# Patient Record
Sex: FEMALE | Race: WHITE | NOT HISPANIC OR LATINO | Employment: FULL TIME | URBAN - NONMETROPOLITAN AREA
[De-identification: names, ages, dates, MRNs, and addresses within clinical notes are randomized per-mention and may not be internally consistent; named-entity substitution may affect disease eponyms.]

---

## 2017-01-17 ENCOUNTER — TELEPHONE (OUTPATIENT)
Dept: LACTATION | Facility: HOSPITAL | Age: 32
End: 2017-01-17

## 2017-01-17 NOTE — TELEPHONE ENCOUNTER
Infant, girl, 9 months    Pt reports appearance of plugged milk duct in same spot twice in 3 days. Was resolved, then reappeared. Denies any constrictive device on breasts or during sleep. Says has been wearing underwire bra entire lactation period with no adverse effects. Says daughter BF's only at night and first thing in AM now. Relays recently morning feeds have not fully emptied breasts. Daughter teething/biting. Tips for resolving plugged ducts given:  Warm/wet compresses before, cold after a BF or pumping session. Pump to softness only if baby does not BF in AM's, massage area during feed. S/s of mastitis explained to call MD at first sign. Denies these at present. Tips given to discourage biting.

## 2017-01-30 ENCOUNTER — OFFICE VISIT (OUTPATIENT)
Dept: OBSTETRICS AND GYNECOLOGY | Facility: CLINIC | Age: 32
End: 2017-01-30

## 2017-01-30 VITALS
SYSTOLIC BLOOD PRESSURE: 110 MMHG | DIASTOLIC BLOOD PRESSURE: 72 MMHG | HEIGHT: 63 IN | WEIGHT: 146 LBS | BODY MASS INDEX: 25.87 KG/M2

## 2017-01-30 DIAGNOSIS — Z97.5 IUD (INTRAUTERINE DEVICE) IN PLACE: ICD-10-CM

## 2017-01-30 DIAGNOSIS — N93.8 DUB (DYSFUNCTIONAL UTERINE BLEEDING): ICD-10-CM

## 2017-01-30 DIAGNOSIS — Z78.9 NON-SMOKER: ICD-10-CM

## 2017-01-30 DIAGNOSIS — R10.2 PELVIC PAIN IN FEMALE: Primary | ICD-10-CM

## 2017-01-30 PROCEDURE — 99213 OFFICE O/P EST LOW 20 MIN: CPT | Performed by: OBSTETRICS & GYNECOLOGY

## 2017-01-30 NOTE — PROGRESS NOTES
Subjective   Yadira Wallace is a 31 y.o. female is here today for follow-up.    Pelvic Pain   The patient's primary symptoms include pelvic pain and vaginal bleeding. The patient's pertinent negatives include no genital itching, genital lesions, genital odor, genital rash, missed menses or vaginal discharge. This is a chronic problem. The current episode started more than 1 year ago. The problem occurs every several days. The problem has been unchanged. The pain is severe. The problem affects both sides. She is not pregnant. Pertinent negatives include no abdominal pain, anorexia, back pain, chills, constipation, diarrhea, discolored urine, dysuria, fever, flank pain, frequency, headaches, hematuria, joint pain, joint swelling, nausea, painful intercourse, rash, sore throat, urgency or vomiting. The vaginal discharge was normal. The vaginal bleeding is spotting. She has not been passing clots. She has not been passing tissue. The symptoms are aggravated by activity. She has tried nothing for the symptoms. The treatment provided no relief. She is sexually active. No, her partner does not have an STD. She uses an IUD for contraception. Her menstrual history has been irregular. Her past medical history is significant for a  section. There is no history of an abdominal surgery, an ectopic pregnancy, endometriosis, a gynecological surgery, herpes simplex, menorrhagia, metrorrhagia, miscarriage, ovarian cysts, perineal abscess, PID, an STD, a terminated pregnancy or vaginosis.       The following portions of the patient's history were reviewed and updated as appropriate: allergies, current medications, past family history, past medical history, past social history, past surgical history and problem list.    Review of Systems   Constitutional: Negative for chills and fever.   HENT: Negative for sore throat.    Eyes: Negative for photophobia and visual disturbance.   Respiratory: Negative for cough, choking and chest  tightness.    Cardiovascular: Negative for chest pain and leg swelling.   Gastrointestinal: Negative for abdominal pain, anorexia, constipation, diarrhea, nausea and vomiting.   Endocrine: Negative for cold intolerance and heat intolerance.   Genitourinary: Positive for pelvic pain. Negative for dysuria, flank pain, frequency, hematuria, menorrhagia, missed menses, urgency and vaginal discharge.   Musculoskeletal: Negative for back pain and joint pain.   Skin: Negative for rash.   Neurological: Negative for headaches.   Psychiatric/Behavioral: Negative for confusion, decreased concentration and dysphoric mood.       Objective   Physical Exam   Constitutional: She appears well-developed and well-nourished.   Neck: Normal range of motion. Neck supple.   Cardiovascular: Normal rate.    Pulmonary/Chest: Effort normal.   Abdominal: Soft. Normal appearance and bowel sounds are normal. She exhibits no shifting dullness, no distension, no pulsatile liver, no fluid wave, no abdominal bruit, no ascites, no pulsatile midline mass and no mass. There is no tenderness.       Genitourinary: There is no rash, tenderness, lesion or injury on the right labia. There is no rash, tenderness, lesion or injury on the left labia. Uterus is tender (mildly tender to palpation but not c/w pelvic pain she is experiencing). Cervix exhibits no motion tenderness, no discharge and no friability. Right adnexum displays no mass, no tenderness and no fullness. Left adnexum displays no mass, no tenderness and no fullness. There is tenderness (tender over levator muscles and bladder) in the vagina. No erythema or bleeding in the vagina. No foreign body in the vagina. No signs of injury around the vagina. No vaginal discharge found.       Skin: Skin is warm and dry.   Psychiatric: She has a normal mood and affect. Her behavior is normal. Judgment and thought content normal.         Assessment/Plan   Yadira was seen today for pelvic pain.    Diagnoses and  all orders for this visit:    Pelvic pain in female  Comments:  Puff and IC research.  If c/w IC, consider bladder Rx.  If not, consider pelvic floor muscle dysfunction as diagnosis and refer to PT.    Non-smoker    IUD (intrauterine device) in place  Comments:  Strings not visualized.  Check placement with U/S    DUB (dysfunctional uterine bleeding)  Comments:  Could be IUD related.  However, pain is not since it started during pregnancy prior to IUD insertion.      Jeovany Lisa MD

## 2017-01-30 NOTE — MR AVS SNAPSHOT
Yadira Wallace   1/30/2017 8:45 AM   Office Visit    Dept Phone:  [phone]   Encounter #:  [id number]    Provider:  Jeovany Lisa MD   Department:  [facility]                Your Full Care Plan              Today's Medication Changes          These changes are accurate as of: 1/30/17  9:16 AM.  If you have any questions, ask your nurse or doctor.               Stop taking medication(s)listed here:     nystatin 670870 UNIT/GM powder   Commonly known as:  MYCOSTATIN   Stopped by:  Jeovany Lisa MD           PNV-DHA+DOCUSATE 27-1. MG capsule   Stopped by:  Jeovany Lisa MD                      Your Updated Medication List          This list is accurate as of: 1/30/17  9:16 AM.  Always use your most recent med list.                MIRENA (52 MG) 20 MCG/24HR IUD   Generic drug:  levonorgestrel       SUMAtriptan 100 MG tablet   Commonly known as:  IMITREX               You Were Diagnosed With        Codes Comments    Pelvic pain in female    -  Primary ICD-10-CM: R10.2  ICD-9-CM: 625.9 Puff and IC research.  If c/w IC, consider bladder Rx.  If not, consider pelvic floor muscle dysfunction as diagnosis and refer to PT.    Non-smoker     ICD-10-CM: Z78.9  ICD-9-CM: V49.89     IUD (intrauterine device) in place     ICD-10-CM: Z97.5  ICD-9-CM: V45.51 Strings not visualized.  Check placement with U/S    DUB (dysfunctional uterine bleeding)     ICD-10-CM: N93.8  ICD-9-CM: 626.8 Could be IUD related.  However, pain is not since it started during pregnancy prior to IUD insertion.      Instructions     None    Patient Instructions History      Upcoming Appointments     Visit Type Date Time Department    OFFICE VISIT 1/30/2017  8:45 AM [facility]    ULTRASOUND 2/13/2017 10:00 AM [facility]    OFFICE VISIT 2/13/2017 10:30 AM [facility]      MyChart Signup     Our records indicate that you have an active [facility] account.    You can  "view your After Visit Summary by going to [employer] and logging in with your [facility] username and password.  If you don't have a [facility] username and password but a parent or guardian has access to your record, the parent or guardian should login with their own [facility] username and password and access your record to view the After Visit Summary.    If you have questions, you can email [email or web address] or call [phone] to talk to our [facility] staff.  Remember, [facility] is NOT to be used for urgent needs.  For medical emergencies, dial 911.               Other Info from Your Visit           Your Appointments     Feb 13, 2017 10:00 AM CST   Ultrasound with ULTRASOUND 2 OBGYN [facility] (--)    [address]  [place] [address]   [phone]            Feb 13, 2017 10:30 AM CST   Office Visit with Jeovany Lisa MD   [facility] (--)    [address]  [place] [facility] [address]   [phone]           Arrive 15 minutes prior to appointment.              Allergies     No Known Allergies      Reason for Visit     Pelvic Pain Pt states at several times per week she experiences lower abdonimal pain and pressure. And she feels like her hormones are out of wack.      Vital Signs     Blood Pressure Height Weight Last Menstrual Period Breastfeeding? Body Mass Index    110/72 63\" (160 cm) 146 lb (66.2 kg) 01/12/2017 No 25.86 kg/m2    Smoking Status                   Never Smoker           Problems and Diagnoses Noted     Pelvic pain in female    -  Primary    Non-smoker        Uses birth control        DUB (dysfunctional uterine bleeding)            "

## 2017-02-02 ENCOUNTER — OFFICE VISIT (OUTPATIENT)
Dept: OTOLARYNGOLOGY | Facility: CLINIC | Age: 32
End: 2017-02-02

## 2017-02-02 ENCOUNTER — PROCEDURE VISIT (OUTPATIENT)
Dept: OTOLARYNGOLOGY | Facility: CLINIC | Age: 32
End: 2017-02-02

## 2017-02-02 VITALS
DIASTOLIC BLOOD PRESSURE: 75 MMHG | WEIGHT: 148 LBS | SYSTOLIC BLOOD PRESSURE: 127 MMHG | HEART RATE: 81 BPM | BODY MASS INDEX: 26.22 KG/M2 | TEMPERATURE: 98.6 F | HEIGHT: 63 IN

## 2017-02-02 DIAGNOSIS — H69.82 ETD (EUSTACHIAN TUBE DYSFUNCTION), LEFT: Primary | ICD-10-CM

## 2017-02-02 PROBLEM — H69.80 ETD (EUSTACHIAN TUBE DYSFUNCTION): Status: ACTIVE | Noted: 2017-02-02

## 2017-02-02 PROCEDURE — 99203 OFFICE O/P NEW LOW 30 MIN: CPT | Performed by: NURSE PRACTITIONER

## 2017-02-02 RX ORDER — AZELASTINE 1 MG/ML
2 SPRAY, METERED NASAL 2 TIMES DAILY
Qty: 30 ML | Refills: 6 | Status: SHIPPED | OUTPATIENT
Start: 2017-02-02 | End: 2017-03-04

## 2017-02-02 RX ORDER — FLUTICASONE PROPIONATE 50 MCG
2 SPRAY, SUSPENSION (ML) NASAL DAILY
Qty: 1 BOTTLE | Refills: 6 | Status: SHIPPED | OUTPATIENT
Start: 2017-02-02 | End: 2017-03-04

## 2017-02-02 NOTE — PROGRESS NOTES
PRIMARY CARE PROVIDER: No Known Provider  REFERRING PROVIDER: No ref. provider found    Chief Complaint   Patient presents with   • Ear Problem     Ears 'Feel clogged up'       Subjective   History of Present Illness:  Yadira Wallace is a  31 y.o.  female who complains of decreased hearing. The symptoms are localized to the left ear. The patient has had moderate symptoms. The symptoms have been relatively constant for the last several months . The symptoms are aggravated by  no identifiable factors . The symptoms are improved by no identifieable factors. The patient states she has had significant loss in hearing in her left hear onset 2 months ago following a sinus infection. She denies pain or drainage. She denies popping, cracking, fullness, or tinnitus. She denies dizziness. She has a history of PE tubes as a child.    Review of Systems:  Review of Systems   Constitutional: Negative.  Negative for appetite change, fatigue and fever.   HENT: Positive for hearing loss. Negative for ear discharge, ear pain, sinus pressure, tinnitus and trouble swallowing.    Respiratory: Negative for cough and shortness of breath.    Cardiovascular: Negative for chest pain.   Gastrointestinal: Negative for nausea and vomiting.   Neurological: Negative for headaches.   All other systems reviewed and are negative.      Past History:  Past Medical History   Diagnosis Date   • 30 weeks gestation of pregnancy    • 32 weeks gestation of pregnancy    • 34 weeks gestation of pregnancy    • Abnormal fetal position    • BMI 28.0-28.9,adult    • Choroid plexus cyst of fetus    • Encounter for IUD removal    • Encounter for routine examination for contraception    • Encounter for routine gynecological examination    • Family planning advice    • IUD (intrauterine device) in place    • IUD check up    • Migraine    • Nonsmoker    • Postpartum state    • Pre-conception counseling    • Rubella non-immune status, antepartum    • Sinus infection    •  Supervision of normal first pregnancy      subsequent visits     Past Surgical History   Procedure Laterality Date   •  section     • Myringotomy w/ tubes Bilateral      x2 (as a child)     Family History   Problem Relation Age of Onset   • Hypertension Father    • Diabetes Father      type 1   • Other Father      CABG   • Coronary artery disease Father    • Diabetes Sister      Type 1, In stable health   • No Known Problems Mother    • No Known Problems Brother    • Breast cancer Neg Hx    • Ovarian cancer Neg Hx    • Uterine cancer Neg Hx    • Colon cancer Neg Hx    • Melanoma Neg Hx    • Prostate cancer Neg Hx      Social History   Substance Use Topics   • Smoking status: Never Smoker   • Smokeless tobacco: None   • Alcohol use Yes      Comment: occasional       Current Outpatient Prescriptions:   •  levonorgestrel (MIRENA, 52 MG,) 20 MCG/24HR IUD, 1 each by Intrauterine route 1 (one) time., Disp: , Rfl:   •  SUMAtriptan (IMITREX) 100 MG tablet, TAKE 1 TABLET BY MOUTH AS DIRECTED, Disp: , Rfl: 6  •  azelastine (ASTELIN) 0.1 % nasal spray, 2 sprays into each nostril 2 (Two) Times a Day for 30 days. Use in each nostril as directed, Disp: 30 mL, Rfl: 6  •  fluticasone (FLONASE) 50 MCG/ACT nasal spray, 2 sprays into each nostril Daily for 30 days. Administer 2 sprays in each nostril for each dose., Disp: 1 bottle, Rfl: 6  Allergies:  Review of patient's allergies indicates no known allergies.    Objective     Vital Signs:  Temp:  [98.6 °F (37 °C)] 98.6 °F (37 °C)  Heart Rate:  [81] 81  BP: (127)/(75) 127/75    Physical Exam:  CONSTITUTIONAL: well nourished, well-developed, alert, oriented, in no acute distress   COMMUNICATION AND VOICE: able to communicate normally, normal voice quality  HEAD: normocephalic, no lesions, atraumatic, no tenderness, no masses   FACE: appearance normal, no lesions, no tenderness, no deformities, facial motion symmetric  SALIVARY GLANDS: parotid glands with no tenderness, no  swelling, no masses, submandibular glands with normal size, nontender  EYES: ocular motility normal, eyelids normal, orbits normal, no proptosis, conjunctiva normal , pupils equal, round   EARS:  Hearing: response to conversational voice normal bilaterally   External Ears: auricles without lesions  Otoscopic: tympanic membrane appearance normal with mild tympanosclerosis bilaterally, no lesions, no perforation, normal mobility, no fluid; there is mild retraction on the left  NOSE:  External Nose: structure normal, no tenderness on palpation, no nasal discharge, no lesions, no evidence of trauma, nostrils patent   Intranasal Exam: nasal mucosa normal, vestibule within normal limits, inferior turbinate normal, nasal septum midline   Nasopharynx: nasopharyngeal mucosa, adenoids within normal limits  ORAL:  Lips: upper and lower lips without lesion   Teeth: dentition within normal limits for age   Gums: gingivae healthy   Oral Mucosa: oral mucosa normal, no mucosal lesions   Floor of Mouth: Warthin’s duct patent, mucosa normal  Tongue: lingual mucosa normal without lesions, normal tongue mobility   Palate: soft and hard palates with normal mucosa and structure  Oropharynx: oropharyngeal mucosa normal, tonsils normal in appearance   HYPOPHARYNX: hypopharyngeal mucosa normal  LARYNX: epiglottis and arytenoid cartilage within normal limits, vocal cord mucosa normal with normal mobility   NECK: neck appearance normal, no masses or tenderness  THYROID: no overt thyromegaly, no tenderness, nodules or mass present on palpation, position midline   LYMPH NODES: no lymphadenopathy  CHEST/RESPIRATORY: respiratory effort normal, normal breath sounds   CARDIOVASCULAR: rate and rhythm normal, extremities without cyanosis or edema    NEUROLOGIC/PSYCHIATRIC: oriented to time, place and person, mood normal, affect appropriate, CN II-XII intact grossly    Results Review:                           Assessment   1. ETD (eustachian tube  dysfunction), left        Plan   Continue current management plan.      -------MEDICATIONS:-------  New Medications Ordered This Visit   Medications   • fluticasone (FLONASE) 50 MCG/ACT nasal spray     Si sprays into each nostril Daily for 30 days. Administer 2 sprays in each nostril for each dose.     Dispense:  1 bottle     Refill:  6   • azelastine (ASTELIN) 0.1 % nasal spray     Si sprays into each nostril 2 (Two) Times a Day for 30 days. Use in each nostril as directed     Dispense:  30 mL     Refill:  6           -----INSTRUCTIONS-----  For the best response, use your nasal sprays every day without skipping doses. It may take several weeks before the full effect is acheived.     -----FOLLOW UP-----  Return in about 6 weeks (around 3/16/2017) for Recheck ears.        Destini Boss, SHAR  17  12:53 PM

## 2017-02-13 ENCOUNTER — OFFICE VISIT (OUTPATIENT)
Dept: OBSTETRICS AND GYNECOLOGY | Facility: CLINIC | Age: 32
End: 2017-02-13

## 2017-02-13 ENCOUNTER — PROCEDURE VISIT (OUTPATIENT)
Dept: OBSTETRICS AND GYNECOLOGY | Facility: CLINIC | Age: 32
End: 2017-02-13

## 2017-02-13 VITALS
HEIGHT: 63 IN | DIASTOLIC BLOOD PRESSURE: 80 MMHG | BODY MASS INDEX: 25.87 KG/M2 | SYSTOLIC BLOOD PRESSURE: 100 MMHG | WEIGHT: 146 LBS

## 2017-02-13 DIAGNOSIS — Z78.9 NON-SMOKER: Primary | ICD-10-CM

## 2017-02-13 DIAGNOSIS — N93.8 DUB (DYSFUNCTIONAL UTERINE BLEEDING): ICD-10-CM

## 2017-02-13 DIAGNOSIS — R10.2 PELVIC PAIN: Primary | ICD-10-CM

## 2017-02-13 DIAGNOSIS — M62.89 PELVIC FLOOR DYSFUNCTION: ICD-10-CM

## 2017-02-13 PROCEDURE — 99213 OFFICE O/P EST LOW 20 MIN: CPT | Performed by: OBSTETRICS & GYNECOLOGY

## 2017-02-13 PROCEDURE — 76830 TRANSVAGINAL US NON-OB: CPT | Performed by: OBSTETRICS & GYNECOLOGY

## 2017-02-13 NOTE — PROGRESS NOTES
Subjective   aYdira Wallace is a 31 y.o. female is here today for follow-up.  IC research and PUFF not c/w interstitial cystitis.  Prior PE consistent with pelvic floor dysfunction.  U/S shows normal IUD position and no gyn etiology for pain.    Pelvic Pain   The patient's primary symptoms include pelvic pain and vaginal bleeding. The patient's pertinent negatives include no genital itching, genital lesions, genital odor, genital rash, missed menses or vaginal discharge. The current episode started more than 1 year ago. The problem occurs every several days. The problem has been unchanged. The pain is moderate. The problem affects both sides. She is not pregnant. Pertinent negatives include no abdominal pain, anorexia, back pain, chills, constipation, diarrhea, discolored urine, dysuria, fever, flank pain, frequency, headaches, hematuria, joint pain, joint swelling, nausea, painful intercourse, rash, sore throat, urgency or vomiting. The vaginal discharge was normal. The vaginal bleeding is spotting. She has not been passing clots. She has not been passing tissue. The symptoms are aggravated by activity. She has tried nothing for the symptoms. The treatment provided no relief. She is sexually active. No, her partner does not have an STD. She uses an IUD for contraception. Her menstrual history has been irregular. Her past medical history is significant for a  section. There is no history of an abdominal surgery, an ectopic pregnancy, endometriosis, a gynecological surgery, herpes simplex, menorrhagia, metrorrhagia, miscarriage, ovarian cysts, perineal abscess, PID, an STD, a terminated pregnancy or vaginosis.       The following portions of the patient's history were reviewed and updated as appropriate: allergies, current medications, past family history, past medical history, past social history, past surgical history and problem list.    Review of Systems   Constitutional: Negative for chills and fever.    HENT: Negative for sore throat.    Eyes: Negative for photophobia and visual disturbance.   Respiratory: Negative for cough, choking and chest tightness.    Cardiovascular: Negative for chest pain and leg swelling.   Gastrointestinal: Negative for abdominal pain, anorexia, constipation, diarrhea, nausea and vomiting.   Endocrine: Negative for cold intolerance and heat intolerance.   Genitourinary: Positive for pelvic pain. Negative for dysuria, flank pain, frequency, hematuria, menorrhagia, missed menses, urgency and vaginal discharge.   Musculoskeletal: Negative for back pain and joint pain.   Skin: Negative for rash.   Neurological: Negative for light-headedness, numbness and headaches.   Psychiatric/Behavioral: Negative for decreased concentration, dysphoric mood and hallucinations.       Objective   Physical Exam   Constitutional: She appears well-developed and well-nourished.   Neck: Normal range of motion. Neck supple.   Cardiovascular: Normal rate and regular rhythm.    Pulmonary/Chest: Effort normal and breath sounds normal.   Abdominal: Soft. Bowel sounds are normal.   Skin: Skin is warm and dry.   Psychiatric: She has a normal mood and affect. Her behavior is normal. Judgment and thought content normal.   Nursing note and vitals reviewed.        Assessment/Plan   Yadira was seen today for pelvic pain.    Diagnoses and all orders for this visit:    Non-smoker    DUB (dysfunctional uterine bleeding)  Comments:  Spotting off and on.    Pelvic floor dysfunction  -     Ambulatory Referral to Physical Therapy Women's Health        Jeovany Lisa MD

## 2017-02-22 ENCOUNTER — HOSPITAL ENCOUNTER (OUTPATIENT)
Dept: PHYSICAL THERAPY | Facility: HOSPITAL | Age: 32
Setting detail: THERAPIES SERIES
Discharge: HOME | End: 2017-02-22
Attending: OBSTETRICS & GYNECOLOGY

## 2017-02-22 DIAGNOSIS — M62.89 PELVIC FLOOR DYSFUNCTION: ICD-10-CM

## 2017-02-22 DIAGNOSIS — R10.2 PELVIC PAIN: Primary | ICD-10-CM

## 2017-02-22 PROCEDURE — 97161 PT EVAL LOW COMPLEX 20 MIN: CPT | Performed by: PHYSICAL THERAPIST

## 2017-02-22 PROCEDURE — 97110 THERAPEUTIC EXERCISES: CPT | Performed by: PHYSICAL THERAPIST

## 2017-02-22 NOTE — PROGRESS NOTES
Outpatient Physical Therapy Women's Health Initial Evaluation  [facility]     Patient Name: Yadira Wallace  : 1985  MRN: [id number]  Today's Date: 2017        Visit Date: 2017    Patient Active Problem List   Diagnosis   • History of migraine headaches   • ETD (eustachian tube dysfunction)        Past Medical History   Diagnosis Date   • 30 weeks gestation of pregnancy    • 32 weeks gestation of pregnancy    • 34 weeks gestation of pregnancy    • Abnormal fetal position    • BMI 28.0-28.9,adult    • Choroid plexus cyst of fetus    • Encounter for IUD removal    • Encounter for routine examination for contraception    • Encounter for routine gynecological examination    • Family planning advice    • IUD (intrauterine device) in place    • IUD check up    • Migraine    • Nonsmoker    • Postpartum state    • Pre-conception counseling    • Rubella non-immune status, antepartum    • Sinus infection    • Supervision of normal first pregnancy      subsequent visits        Past Surgical History   Procedure Laterality Date   •  section     • Myringotomy w/ tubes Bilateral      x2 (as a child)         Visit Dx:    ICD-10-CM ICD-9-CM   1. Pelvic pain R10.2 [id number]   2. Pelvic floor dysfunction N81.84 618.83             Patient History       17 1100          History    Chief Complaint Pain  -KR      Type of Pain Other pain   pelvic pain  -KR      Date Current Problem(s) Began --   2015  -KR      Brief Description of Current Complaint  2016. She reports bad pelvic pain throughout pregnacy. She weight 3 11 when born. She reports pain never went away. She repots starting to try to work last fall and increased pain with activity. She reports increased pain with standing, walking, sitting on harder surfaces. Lower abdominal pain, pressure.    -KR      Patient/Caregiver Goals Relieve pain;Return to prior level of function  -KR      Patient's Rating of General Health Good  -KR       Hand Dominance right-handed  -KR      What clinical tests have you had for this problem? Other 1 (comment)   ultrasound  -KR      Are you or can you be pregnant No  -KR      Pain     Pain Location Pelvis  -KR      Pain at Present 0  -KR      Pain at Best 0  -KR      Pain at Worst 9  -KR      Pain Frequency Intermittent  -KR      Pain Description Pressure;Shooting;Sharp  -KR      What Performance Factors Make the Current Problem(s) WORSE? standing, walking, gym activities, sitting on hard floor surface  -KR      What Performance Factors Make the Current Problem(s) BETTER? rest  -KR      Is your sleep disturbed? No  -KR      Fall Risk Assessment    Any falls in the past year: No  -KR      Daily Activities    Pt Participated in POC and Goals Yes  -KR      Safety    Are you being hurt, hit, or frightened by anyone at home or in your life? No  -KR      Are you being neglected by a caregiver No  -KR        User Key  (r) = Recorded By, (t) = Taken By, (c) = Cosigned By    Initials Name Provider Type    CHUCK Villafuerte, PT DPT Physical Therapist                Women's Health       17 1100          Pregnancy Questions    Number of Pregnancies 2  -KR      Number of Children 2  -KR      Type of Previous Deliveries Vaginal;  -KR      Do you have radicular pain or numbness? No  -KR      Are you currently exercising? Yes   increases pain  -KR        User Key  (r) = Recorded By, (t) = Taken By, (c) = Cosigned By    Initials Name Provider Type    CHUCK Villafuerte PT DPT Physical Therapist              PT Ortho       17 1100    Posture/Observations    Alignment Options Lumbar lordosis;Iliac crests  -KR    Lumbar lordosis Mild;Increased  -KR    Sensation    Sensation WNL? WNL  -KR    Quarter Clearing    Quarter Clearing Lower Quarter Clearing  -KR    Lumbar ROM Screen- Lower Quarter Clearing    Lumbar Flexion Normal  -KR    Lumbar Extension Normal  -KR    Special Tests/Palpation    Special  Tests/Palpation Lumbar/SI;Hip  -KR    Lumbosacral Palpation    Pubic Symphysis Bilateral:;Tender  -KR    Lumbosacral Palpation? Yes   diastasis- 2 knuckle depth at umbilicus and 2 finger width  -KR    Lumbar/SI Special Tests    Sacral Spring Test (SI Dysfunction) Bilateral:;Negative  -KR    Special Lumbosacral Questions    Are you pregnant? No  -KR    Have you had any abdominal surgeries? Yes  -KR    Hip/Thigh Palpation    Adductors Bilateral:;Guarded/taut  -KR    Hip/Thigh Palpation? Yes  -KR    Hip Special Tests    Trendelenberg sign (gluteus medius weakness) Bilateral:;Positive  -KR    Leg Length Test    Apparent Unequal   L innominate posteriorly rotated slight upslip; MET improved  -KR    MMT (Manual Muscle Testing)    General MMT Assessment Detail hip abduction 3+/5 adduction 4-/5  -KR    Pathomechanics    Lower Extremity Pathomechanics Trendelenburg with midstance  -KR    Balance Skills Training    SLS <5 seconds without compensation  -KR    Stairs Assessment/Treatment    Stairs, Comment hip dropo and medial knee dive with descending  -KR      User Key  (r) = Recorded By, (t) = Taken By, (c) = Cosigned By    Initials Name Provider Type    CHUCK Villafuerte, PT DPT Physical Therapist                           PT Assessment/Plan       02/22/17 1100          PT Assessment    Functional Limitations Impaired gait;Limitation in home management;Limitations in community activities;Performance in leisure activities;Performance in sport activities  -KR      Impairments Balance;Gait;Muscle strength;Pain;Posture;Joint mobility;Impaired postural alignment  -KR      Assessment Comments  presents with compliant of pelvic/lower abdominal pain that increases with activity. Upon assessment I found her pelvis to be malaligned and decreased stabiltiy throughout her hips and core. I have issued her a stability belt and start no gentle core/hip strengthening. Next visit we will assess her pelvic floor and give home  program/progress as appropriate. She is very motived and should progress well.   -KR      Please refer to paper survey for additional self-reported information Yes  -KR      Rehab Potential Excellent  -KR      Patient/caregiver participated in establishment of treatment plan and goals Yes  -KR      Patient would benefit from skilled therapy intervention Yes  -KR      PT Plan    PT Frequency --   1-4 times per month  -KR      Predicted Duration of Therapy Intervention (days/wks) 2-4 months  -KR      Planned CPT's? PT EVAL: 82475;PT THER PROC EA 15 MIN: 31509;PT THER ACT EA 15 MIN: 10684;PT MANUAL THERAPY EA 15 MIN: 69680;PT NEUROMUSC RE-EDUCATION EA 15 MIN: 06555;PT GAIT TRAINING EA 15 MIN: 15798;PT ELECTRICAL STIM UNATTEND: ;PT ELECTRICAL STIM ATTD EA 15 MIN: 13071  -KR      PT Plan Comments We will start by ensuring proper alignment of her pelvis and progressing with stabiltily activities as tolelrated. We will progress her to returning to gym activities.   -KR        User Key  (r) = Recorded By, (t) = Taken By, (c) = Cosigned By    Initials Name Provider Type    CHUCK Villafuerte, PT DPT Physical Therapist                  Exercises       02/22/17 1100          Exercise 1    Exercise Name 1 TA  -KR      Reps 1 5  -KR      Exercise 2    Exercise Name 2 PPT  -KR      Reps 2 5  -KR      Exercise 3    Exercise Name 3 hooklying clam with green TB   -KR      Reps 3 5  -KR      Exercise 4    Exercise Name 4 hip adduction  -KR      Reps 4 5  -KR        User Key  (r) = Recorded By, (t) = Taken By, (c) = Cosigned By    Initials Name Provider Type    CHUCK Villafuerte, PT DPT Physical Therapist                            PT OP Goals       02/22/17 1100          PT Short Term Goals    STG Date to Achieve 04/19/17  -KR      STG 1 Pt will report pain no greater than 3-4/10 with activity  -KR      STG 1 Progress New  -KR      STG 2 Pt will be independent with initial HEP.   -KR      STG 2 Progress New  -KR      STG  3 Pt will have aligned pelvis for 2 visits.   -KR      STG 3 Progress New  -KR      Long Term Goals    LTG Date to Achieve 06/14/17  -KR      LTG 1 Pt will be able to return to gym activity without increase in pain.   -KR      LTG 1 Progress New  -KR      LTG 2 Pt will have 4/5 or greater hip abduction.   -KR      LTG 2 Progress New  -KR      LTG 3 Pt will perform SLS for 30 seconds or greater without compensation.   -KR      LTG 3 Progress New  -KR      LTG 4 Pt perform step down with minimal medial knee dive or hip drop.   -KR      LTG 4 Progress New  -KR      Time Calculation    PT Goal Re-Cert Due Date 03/24/17  -KR        User Key  (r) = Recorded By, (t) = Taken By, (c) = Cosigned By    Initials Name Provider Type    CHUCK Villafuerte PT DPT Physical Therapist                Therapy Education       02/22/17 1100    Therapy Education    Given HEP;Posture/body mechanics;Symptoms/condition management   issued M SI belt, educated on wear  -KR    Program New   Ta, PPT, hip abd with green TB and hip Add  -KR    How Provided Verbal;Demonstration;Written  -KR    Provided to Patient  -KR    Level of Understanding Verbalized;Demonstrated  -KR      User Key  (r) = Recorded By, (t) = Taken By, (c) = Cosigned By    Initials Name Provider Type    CHUCK Villafuerte PT DPT Physical Therapist                 Time Calculation:   Start Time: 1100  Stop Time: 1154  Time Calculation (min): 54 min  Total Timed Code Minutes- PT: 12 minute(s)    Therapy Charges for Today     Code Description Service Date Service Provider Modifiers Qty    48229026677 HC PT EVAL LOW COMPLEXITY 3 2/22/2017 Nena Villafuerte, PT DPT GP 1    29292137495 HC PT THER PROC EA 15 MIN 2/22/2017 Nena Villafuerte, PT DPT GP 1                  Nena Villafuerte PT DPT  2/22/2017

## 2017-03-08 ENCOUNTER — HOSPITAL ENCOUNTER (OUTPATIENT)
Dept: PHYSICAL THERAPY | Facility: HOSPITAL | Age: 32
Setting detail: THERAPIES SERIES
Discharge: HOME | End: 2017-03-08

## 2017-03-08 DIAGNOSIS — M62.89 PELVIC FLOOR DYSFUNCTION: ICD-10-CM

## 2017-03-08 DIAGNOSIS — R10.2 PELVIC PAIN: Primary | ICD-10-CM

## 2017-03-08 PROCEDURE — 97110 THERAPEUTIC EXERCISES: CPT | Performed by: PHYSICAL THERAPIST

## 2017-03-08 NOTE — PROGRESS NOTES
Outpatient Physical Therapy Women's Health Treatment Note  [facility]     Patient Name: Yadira Wallace  : 1985  MRN: [id number]  Today's Date: 3/8/2017        Visit Date: 2017    Visit Dx:    ICD-10-CM ICD-9-CM   1. Pelvic pain R10.2 [id number]   2. Pelvic floor dysfunction N81.84 618.83       Patient Active Problem List   Diagnosis   • History of migraine headaches   • ETD (eustachian tube dysfunction)                               PT Assessment/Plan       17 1438       PT Assessment    Assessment Comments No goals met today, this was her first visit since initial evaluation. She is reporting less pain, but only completed strenuous physical activity once since her first visit. Her pelvis was in alignment today. I updated her HEP to progress with hip and core stability. I also applied KT tape to facilitate her rectus abdominis.   -KR     PT Plan    PT Plan Comments Re-assess HEP and taping. Assess pelvic floor, as needed. Progress with standing, higher intensity activity next visit and assess tolerance.   -KR       User Key  (r) = Recorded By, (t) = Taken By, (c) = Cosigned By    Initials Name Provider Type    CHUCK Villafuerte, PT DPT Physical Therapist                      Exercises       17 1438          Subjective Comments    Subjective Comments She repots feeling like she is doing better. She reports not doing anything strenuous in the last couple weeks.   -KR      Subjective Pain    Able to rate subjective pain? yes  -KR      Exercise 1    Exercise Name 1 SB bridges   -KR      Sets 1 2  -KR      Reps 1 15  -KR      Exercise 2    Exercise Name 2 sidelying clams  -KR      Sets 2 2  -KR      Reps 2 20  -KR      Exercise 3    Exercise Name 3 BKFO  -KR      Reps 3 20  -KR      Exercise 4    Exercise Name 4 scar tissue massage on lower abdomminal  -KR      Time (Minutes) 4 5  -KR      Exercise 5    Exercise Name 5 Kt applied to faciliate rectus abdominus  -KR        User Key  (r) =  Recorded By, (t) = Taken By, (c) = Cosigned By    Initials Name Provider Type    CHUCK Villafuerte, PT DPT Physical Therapist                            PT OP Goals       03/08/17 1438       PT Short Term Goals    STG Date to Achieve 04/19/17  -KR     STG 1 Pt will report pain no greater than 3-4/10 with activity  -KR     STG 1 Progress Ongoing  -KR     STG 1 Progress Comments 5-6/10 post running without SI belt only last couple hours  -KR     STG 2 Pt will be independent with initial HEP.   -KR     STG 2 Progress Ongoing  -KR     STG 2 Progress Comments compliant  -KR     STG 3 Pt will have aligned pelvis for 2 visits.   -KR     STG 3 Progress Ongoing  -KR     STG 3 Progress Comments aligned today  -KR     Long Term Goals    LTG Date to Achieve 06/14/17  -KR     LTG 1 Pt will be able to return to gym activity without increase in pain.   -KR     LTG 1 Progress Ongoing  -KR     LTG 2 Pt will have 4/5 or greater hip abduction.   -KR     LTG 2 Progress Ongoing  -KR     LTG 3 Pt will perform SLS for 30 seconds or greater without compensation.   -KR     LTG 3 Progress Ongoing  -KR     LTG 4 Pt perform step down with minimal medial knee dive or hip drop.   -KR     LTG 4 Progress Ongoing  -KR     Time Calculation    PT Goal Re-Cert Due Date 03/24/17  -KR       User Key  (r) = Recorded By, (t) = Taken By, (c) = Cosigned By    Initials Name Provider Type    CHUCK Villafuerte, PT DPT Physical Therapist                 Therapy Education       03/08/17 1438          Therapy Education    Given HEP;Posture/body mechanics  -KR      Program Progressed   BKFO, sidelying clams, bridges  -KR      How Provided Verbal;Demonstration;Written  -KR      Provided to Patient  -KR      Level of Understanding Verbalized;Demonstrated  -KR        User Key  (r) = Recorded By, (t) = Taken By, (c) = Cosigned By    Initials Name Provider Type    CHUCK Villafuerte, PT DPT Physical Therapist                Time Calculation:   Start Time:  1438  Stop Time: 1520  Time Calculation (min): 42 min  Total Timed Code Minutes- PT: 40 minute(s)    Therapy Charges for Today     Code Description Service Date Service Provider Modifiers Qty    31133533564  PT THER PROC EA 15 MIN 3/8/2017 Nena Villafuerte, PT DPT GP 3                    Nena Villafuerte, PT DPT  3/8/2017

## 2017-03-15 ENCOUNTER — HOSPITAL ENCOUNTER (OUTPATIENT)
Dept: PHYSICAL THERAPY | Facility: HOSPITAL | Age: 32
Setting detail: THERAPIES SERIES
Discharge: HOME | End: 2017-03-15

## 2017-03-15 DIAGNOSIS — R10.2 PELVIC PAIN: Primary | ICD-10-CM

## 2017-03-15 DIAGNOSIS — M62.89 PELVIC FLOOR DYSFUNCTION: ICD-10-CM

## 2017-03-15 PROCEDURE — 97110 THERAPEUTIC EXERCISES: CPT | Performed by: PHYSICAL THERAPIST

## 2017-03-15 NOTE — PROGRESS NOTES
Outpatient Physical Therapy Women's Health Treatment Note  [facility]     Patient Name: Yadira Wallace  : 1985  MRN: [id number]  Today's Date: 3/15/2017        Visit Date: 03/15/2017    Visit Dx:    ICD-10-CM ICD-9-CM   1. Pelvic pain R10.2 [id number]   2. Pelvic floor dysfunction N81.84 618.83       Patient Active Problem List   Diagnosis   • History of migraine headaches   • ETD (eustachian tube dysfunction)                               PT Assessment/Plan       03/15/17 1503       PT Assessment    Assessment Comments Pelvic floor assessment reproduced patient's pain today. She did have muscle guarding with referred pain throughout. I have updated her home program to include pelvic floor stretch. I did not progress her other exercises.   -KR     PT Plan    PT Plan Comments Assess HEP and progression, possibly teach self pelvic floor massage and progress towards functional/standing activities.   -KR       User Key  (r) = Recorded By, (t) = Taken By, (c) = Cosigned By    Initials Name Provider Type    CHUCK Villafuerte, PT DPT Physical Therapist                      Exercises       03/15/17 1503          Subjective Comments    Subjective Comments She reports a little more sore this week. She reports going for walks and it didn't hurt at all. She reports other than that not much physical activity, just overall soreness  -KR      Subjective Pain    Able to rate subjective pain? yes  -KR      Exercise 1    Exercise Name 1 verbal consent for internal pelvic floor assessment; see assessment for results  -KR      Exercise 2    Exercise Name 2 happy baby stretch  -KR      Time (Seconds) 2 30  -KR      Exercise 3    Exercise Name 3 discussed HEP and progression  -KR        User Key  (r) = Recorded By, (t) = Taken By, (c) = Cosigned By    Initials Name Provider Type    CHUCK Villafuerte, PT DPT Physical Therapist                            PT OP Goals       03/15/17 1503       PT Short Term Goals    STG Date  to Achieve 04/19/17  -KR     STG 1 Pt will report pain no greater than 3-4/10 with activity  -KR     STG 1 Progress Ongoing  -KR     STG 1 Progress Comments 5/10   -KR     STG 2 Pt will be independent with initial HEP.   -KR     STG 2 Progress Ongoing  -KR     STG 2 Progress Comments added happy baby stretch  -KR     STG 3 Pt will have aligned pelvis for 2 visits.   -KR     STG 3 Progress Met  -KR     Long Term Goals    LTG Date to Achieve 06/14/17  -KR     LTG 1 Pt will be able to return to gym activity without increase in pain.   -KR     LTG 1 Progress Ongoing  -KR     LTG 1 Progress Comments has not returned yet  -KR     LTG 2 Pt will have 4/5 or greater hip abduction.   -KR     LTG 2 Progress Ongoing  -KR     LTG 3 Pt will perform SLS for 30 seconds or greater without compensation.   -KR     LTG 3 Progress Ongoing  -KR     LTG 4 Pt perform step down with minimal medial knee dive or hip drop.   -KR     LTG 4 Progress Ongoing  -KR     Time Calculation    PT Goal Re-Cert Due Date 03/24/17  -KR       User Key  (r) = Recorded By, (t) = Taken By, (c) = Cosigned By    Initials Name Provider Type    CHUCK Villafuerte PT DPT Physical Therapist                 Therapy Education       03/15/17 1503          Therapy Education    Given HEP;Symptoms/condition management;Posture/body mechanics  -KR      Program Progressed  -KR      How Provided Verbal;Demonstration;Written  -KR      Provided to Patient  -KR      Level of Understanding Verbalized;Demonstrated  -KR        User Key  (r) = Recorded By, (t) = Taken By, (c) = Cosigned By    Initials Name Provider Type    CHUCK Villafuerte PT DPT Physical Therapist                Time Calculation:   Start Time: 1500  Stop Time: 1541  Time Calculation (min): 41 min    Therapy Charges for Today     Code Description Service Date Service Provider Modifiers Qty    79635092585 HC PT THER PROC EA 15 MIN 3/15/2017 Nena Villafuerte, PT DPT GP 3                    Nena BULLARD  Christo, PT DPT  3/15/2017

## 2017-03-22 ENCOUNTER — HOSPITAL ENCOUNTER (OUTPATIENT)
Dept: PHYSICAL THERAPY | Facility: HOSPITAL | Age: 32
Setting detail: THERAPIES SERIES
Discharge: HOME | End: 2017-03-22

## 2017-03-22 DIAGNOSIS — R10.2 PELVIC PAIN: Primary | ICD-10-CM

## 2017-03-22 DIAGNOSIS — M62.89 PELVIC FLOOR DYSFUNCTION: ICD-10-CM

## 2017-03-22 PROCEDURE — 97110 THERAPEUTIC EXERCISES: CPT | Performed by: PHYSICAL THERAPIST

## 2017-03-29 ENCOUNTER — APPOINTMENT (OUTPATIENT)
Dept: PHYSICAL THERAPY | Facility: HOSPITAL | Age: 32
End: 2017-03-29

## 2017-04-04 ENCOUNTER — HOSPITAL ENCOUNTER (OUTPATIENT)
Dept: PHYSICAL THERAPY | Facility: HOSPITAL | Age: 32
Setting detail: THERAPIES SERIES
Discharge: HOME | End: 2017-04-04

## 2017-04-04 DIAGNOSIS — R10.2 PELVIC PAIN: Primary | ICD-10-CM

## 2017-04-04 DIAGNOSIS — M62.89 PELVIC FLOOR DYSFUNCTION: ICD-10-CM

## 2017-04-04 PROCEDURE — 97110 THERAPEUTIC EXERCISES: CPT | Performed by: PHYSICAL THERAPIST

## 2017-04-04 NOTE — PROGRESS NOTES
Outpatient Physical Therapy Women's Health Treatment Note  [facility]     Patient Name: Yadira Wallace  : 1985  MRN: [id number]  Today's Date: 2017        Visit Date: 2017    Visit Dx:    ICD-10-CM ICD-9-CM   1. Pelvic pain R10.2 [id number]   2. Pelvic floor dysfunction N81.84 618.83       Patient Active Problem List   Diagnosis   • History of migraine headaches   • ETD (eustachian tube dysfunction)                               PT Assessment/Plan       17 1545       PT Assessment    Assessment Comments Yadira has met 5/7 goals and made excellent progress towards last two. She has an updated HEP to continue to progress. She is still having slight pelvic floor muscle guarding and core/hip weakness, but this is not limiting her recreational activity at this point. She is to call with any questions or concerns, but she should continue to progress and met her goals with her continuation on her home and gym program.   -KR       User Key  (r) = Recorded By, (t) = Taken By, (c) = Cosigned By    Initials Name Provider Type    CHUCK Villafuerte, PT DPT Physical Therapist                      Exercises       17 1013          Subjective Comments    Subjective Comments She reports doing better. attending several workout classes and overall less pain. She reports occassionaly pain with sitting on a hard surface, but only if she has been having pain.   -KR      Subjective Pain    Able to rate subjective pain? yes  -KR      Pre-Treatment Pain Level 0  -KR      Post-Treatment Pain Level 0  -KR      Exercise 1    Exercise Name 1 hooklying lateral/inferior hip mobilizations grade 2/3  -KR      Time (Minutes) 1 6  -KR      Exercise 2    Exercise Name 2 discussed progression, self PF massage, planks/side planks  -KR      Exercise 3    Exercise Name 3 assessed pelvic floor only minimal guarding 2nd/3rd layer on R  -KR        User Key  (r) = Recorded By, (t) = Taken By, (c) = Cosigned By    Initials Name  Provider Type    CHUCK Villafuerte, PT DPT Physical Therapist                            PT OP Goals       04/04/17 1548       PT Short Term Goals    STG Date to Achieve 04/26/17  -KR     STG 1 Pt will report pain no greater than 3-4/10 with activity  -KR     STG 1 Progress Met  -KR     STG 1 Progress Comments 4/10 at worst with gym activities, lasted only a couple hours.   -KR     STG 2 Pt will be independent with initial HEP.   -KR     STG 2 Progress Met  -KR     STG 3 Pt will have aligned pelvis for 2 visits.   -KR     STG 3 Progress Met  -KR     Long Term Goals    LTG Date to Achieve 06/07/17  -KR     LTG 1 Pt will be able to return to gym activity without increase in pain.   -KR     LTG 1 Progress Partially Met  -KR     LTG 2 Pt will have 4/5 or greater hip abduction.   -KR     LTG 2 Progress Met  -KR     LTG 2 Progress Comments L 4+/5 R 4/5   -KR     LTG 3 Pt will perform SLS for 30 seconds or greater without compensation.   -KR     LTG 3 Progress Met  -KR     LTG 4 Pt perform step down with minimal medial knee dive or hip drop.   -KR     LTG 4 Progress Partially Met  -KR     LTG 4 Progress Comments slight medial knee dive noted  -KR     Time Calculation    PT Goal Re-Cert Due Date 04/21/17  -KR       User Key  (r) = Recorded By, (t) = Taken By, (c) = Cosigned By    Initials Name Provider Type    CHUCK Villafuerte, PT DPT Physical Therapist                 Therapy Education       04/04/17 1545          Therapy Education    Given HEP;Symptoms/condition management  -KR      Program Progressed  -KR      How Provided Verbal;Demonstration;Written  -KR      Provided to Patient  -KR      Level of Understanding Verbalized  -KR        User Key  (r) = Recorded By, (t) = Taken By, (c) = Cosigned By    Initials Name Provider Type    CHUCK Villafuerte, PT DPT Physical Therapist                Time Calculation:   Start Time: 1545  Stop Time: 1623  Time Calculation (min): 38 min  Total Timed Code Minutes- PT: 38  minute(s)    Therapy Charges for Today     Code Description Service Date Service Provider Modifiers Qty    67854828350 HC PT THER PROC EA 15 MIN 4/4/2017 Nena Villafuerte, PT DPT GP 3                    Nena Villafuerte, PT DPT  4/4/2017

## 2017-06-01 RX ORDER — SUMATRIPTAN 100 MG/1
100 TABLET, FILM COATED ORAL ONCE AS NEEDED
Qty: 12 TABLET | Refills: 6 | Status: SHIPPED | OUTPATIENT
Start: 2017-06-01 | End: 2019-06-03

## 2017-09-07 ENCOUNTER — OFFICE VISIT (OUTPATIENT)
Dept: OBSTETRICS AND GYNECOLOGY | Facility: CLINIC | Age: 32
End: 2017-09-07

## 2017-09-07 VITALS
DIASTOLIC BLOOD PRESSURE: 64 MMHG | HEIGHT: 63 IN | WEIGHT: 148 LBS | SYSTOLIC BLOOD PRESSURE: 104 MMHG | BODY MASS INDEX: 26.22 KG/M2

## 2017-09-07 DIAGNOSIS — Z01.419 WELL WOMAN EXAM WITH ROUTINE GYNECOLOGICAL EXAM: Primary | ICD-10-CM

## 2017-09-07 DIAGNOSIS — F41.9 ANXIETY: ICD-10-CM

## 2017-09-07 DIAGNOSIS — Z30.431 ENCOUNTER FOR ROUTINE CHECKING OF INTRAUTERINE CONTRACEPTIVE DEVICE: ICD-10-CM

## 2017-09-07 LAB — PAP SMEAR: NORMAL

## 2017-09-07 PROCEDURE — 99395 PREV VISIT EST AGE 18-39: CPT | Performed by: NURSE PRACTITIONER

## 2017-09-07 PROCEDURE — G0123 SCREEN CERV/VAG THIN LAYER: HCPCS | Performed by: NURSE PRACTITIONER

## 2017-09-07 RX ORDER — AZELASTINE 1 MG/ML
SPRAY, METERED NASAL
COMMUNITY
Start: 2017-09-06 | End: 2019-06-03

## 2017-09-07 RX ORDER — BUPROPION HYDROCHLORIDE 150 MG/1
150 TABLET ORAL DAILY
Qty: 30 TABLET | Refills: 12 | Status: SHIPPED | OUTPATIENT
Start: 2017-09-07 | End: 2019-06-03

## 2017-09-07 RX ORDER — ALPRAZOLAM 0.25 MG/1
0.25 TABLET ORAL 3 TIMES DAILY PRN
Qty: 90 TABLET | Refills: 0 | Status: SHIPPED | OUTPATIENT
Start: 2017-09-07 | End: 2019-06-03

## 2017-09-07 RX ORDER — FLUTICASONE PROPIONATE 50 MCG
SPRAY, SUSPENSION (ML) NASAL
COMMUNITY
Start: 2017-09-06 | End: 2019-06-03

## 2017-09-07 NOTE — PROGRESS NOTES
"Subjective   Yadira Wallace is a 32 y.o. female     HPI Comments: Here for yearly check up. Having anxiety, with panic and depression.    Gynecologic Exam   The patient's pertinent negatives include no pelvic pain, vaginal bleeding or vaginal discharge. The patient is experiencing no pain. Pertinent negatives include no abdominal pain, anorexia, back pain, chills, constipation, diarrhea, discolored urine, dysuria, fever, flank pain, frequency, headaches, hematuria, joint pain, joint swelling, nausea, painful intercourse, rash, sore throat, urgency or vomiting. Nothing aggravates the symptoms. She is sexually active. She uses an IUD for contraception. Her menstrual history has been irregular.             /64  Ht 63\" (160 cm)  Wt 148 lb (67.1 kg)  LMP 2017 (Exact Date)  BMI 26.22 kg/m2      Outpatient Encounter Prescriptions as of 2017   Medication Sig Dispense Refill   • azelastine (ASTELIN) 0.1 % nasal spray      • fluticasone (FLONASE) 50 MCG/ACT nasal spray      • levonorgestrel (MIRENA, 52 MG,) 20 MCG/24HR IUD 1 each by Intrauterine route 1 (One) Time. 2016     • SUMAtriptan (IMITREX) 100 MG tablet Take 1 tablet by mouth 1 (One) Time As Needed for Migraine for up to 1 dose. 12 tablet 6   • ALPRAZolam (XANAX) 0.25 MG tablet Take 1 tablet by mouth 3 (Three) Times a Day As Needed for Anxiety. 90 tablet 0   • buPROPion XL (WELLBUTRIN XL) 150 MG 24 hr tablet Take 1 tablet by mouth Daily. 30 tablet 12     No facility-administered encounter medications on file as of 2017.            Surgical History  Past Surgical History:   Procedure Laterality Date   •  SECTION     • MYRINGOTOMY W/ TUBES Bilateral     x2 (as a child)         Family History  Family History   Problem Relation Age of Onset   • Hypertension Father    • Diabetes Father      type 1   • Other Father      CABG   • Coronary artery disease Father    • Diabetes Sister      Type 1, In stable health   • No Known Problems Mother    • " No Known Problems Brother    • Breast cancer Neg Hx    • Ovarian cancer Neg Hx    • Uterine cancer Neg Hx    • Colon cancer Neg Hx    • Melanoma Neg Hx    • Prostate cancer Neg Hx              The following portions of the patient's history were reviewed and updated as appropriate: allergies, current medications, past family history, past medical history, past social history, past surgical history and problem list.    Review of Systems   Constitutional: Negative for activity change, appetite change, chills, diaphoresis, fatigue, fever and unexpected weight change.   HENT: Negative for congestion, ear discharge, ear pain, facial swelling, hearing loss, mouth sores, nosebleeds, postnasal drip, rhinorrhea, sinus pressure, sneezing, sore throat, tinnitus, trouble swallowing and voice change.    Eyes: Negative for photophobia, pain, discharge, redness, itching and visual disturbance.   Respiratory: Negative for apnea, cough, choking, chest tightness and shortness of breath.    Cardiovascular: Negative for chest pain, palpitations and leg swelling.   Gastrointestinal: Negative for abdominal distention, abdominal pain, anal bleeding, anorexia, blood in stool, constipation, diarrhea, nausea, rectal pain and vomiting.   Endocrine: Negative for cold intolerance and heat intolerance.   Genitourinary: Negative for decreased urine volume, difficulty urinating, dyspareunia, dysuria, flank pain, frequency, genital sores, hematuria, menstrual problem, pelvic pain, urgency, vaginal bleeding, vaginal discharge and vaginal pain.   Musculoskeletal: Negative for arthralgias, back pain, joint pain, joint swelling and myalgias.   Skin: Negative for color change and rash.   Allergic/Immunologic: Negative for environmental allergies.   Neurological: Negative for dizziness, syncope, weakness, numbness and headaches.   Hematological: Negative for adenopathy.   Psychiatric/Behavioral: Negative for agitation, confusion and sleep disturbance.  The patient is not nervous/anxious.              Objective   Physical Exam   Constitutional: She is oriented to person, place, and time. She appears well-developed and well-nourished. No distress.   HENT:   Head: Normocephalic.   Right Ear: External ear normal.   Left Ear: External ear normal.   Nose: Nose normal.   Mouth/Throat: Oropharynx is clear and moist.   Eyes: Conjunctivae are normal. Right eye exhibits no discharge. Left eye exhibits no discharge. No scleral icterus.   Neck: Normal range of motion. Neck supple. Carotid bruit is not present. No tracheal deviation present. No thyromegaly present.   Cardiovascular: Normal rate, regular rhythm, normal heart sounds and intact distal pulses.    No murmur heard.  Pulmonary/Chest: Effort normal and breath sounds normal. No respiratory distress. She has no wheezes. Right breast exhibits no inverted nipple, no mass, no nipple discharge, no skin change and no tenderness. Left breast exhibits no inverted nipple, no mass, no nipple discharge, no skin change and no tenderness. Breasts are symmetrical. There is no breast swelling.   Abdominal: Soft. She exhibits no distension and no mass. There is no tenderness. There is no guarding. No hernia. Hernia confirmed negative in the right inguinal area and confirmed negative in the left inguinal area.   Genitourinary: Rectum normal, vagina normal and uterus normal. Rectal exam shows no mass. No breast tenderness, discharge or bleeding. Pelvic exam was performed with patient supine. There is no rash, tenderness, lesion or injury on the right labia. There is no rash, tenderness, lesion or injury on the left labia. Uterus is not enlarged, not fixed and not tender. Cervix exhibits no motion tenderness, no discharge and no friability. Right adnexum displays no mass, no tenderness and no fullness. Left adnexum displays no mass, no tenderness and no fullness. No erythema, tenderness or bleeding in the vagina. No foreign body in the  vagina. No signs of injury around the vagina. No vaginal discharge found.   Genitourinary Comments:   BSU normal  Urethral meatus  Normal  Perineum  Normal   Musculoskeletal: Normal range of motion. She exhibits no edema or tenderness.   Lymphadenopathy:        Head (right side): No submental, no submandibular, no tonsillar, no preauricular, no posterior auricular and no occipital adenopathy present.        Head (left side): No submental, no submandibular, no tonsillar, no preauricular, no posterior auricular and no occipital adenopathy present.     She has no cervical adenopathy.        Right cervical: No superficial cervical, no deep cervical and no posterior cervical adenopathy present.       Left cervical: No superficial cervical, no deep cervical and no posterior cervical adenopathy present.     She has no axillary adenopathy.        Right: No inguinal adenopathy present.        Left: No inguinal adenopathy present.   Neurological: She is alert and oriented to person, place, and time. Coordination normal.   Skin: Skin is warm and dry. No bruising and no rash noted. She is not diaphoretic. No erythema.   Psychiatric: She has a normal mood and affect. Her behavior is normal. Judgment and thought content normal.   Nursing note and vitals reviewed.        Assessment/Plan   Yadira was seen today for gynecologic exam.    Diagnoses and all orders for this visit:    Well woman exam with routine gynecological exam  Normal GYN exam. Will RTO for lab work. Encouraged SBE.  Discussed weight management and importance of maintaining a healthy weight. Discussed Vitamin D intake and the importance of adequate vitamin D.   -     Liquid-based Pap Smear, Screening  -     CBC & Differential  -     Comprehensive Metabolic Panel  -     Lipid Panel With LDL / HDL Ratio  -     TSH  -     T3, Uptake  -     Vitamin D 25 Hydroxy  -     T4, Free  -     Hemoglobin A1c  -     Urinalysis With / Microscopic If Indicated  -     UA / M With /  Rflx Culture([facility] ONLY)    Anxiety  Comments:  Having panic attacks and some depression. Having trouble sleeping and crying a lot. Will try Xanax and Wellbutrin. RTO in 1 month.   Orders:  -     buPROPion XL (WELLBUTRIN XL) 150 MG 24 hr tablet; Take 1 tablet by mouth Daily.  -     ALPRAZolam (XANAX) 0.25 MG tablet; Take 1 tablet by mouth 3 (Three) Times a Day As Needed for Anxiety.    Encounter for routine checking of intrauterine contraceptive device  Comments:  IUD strings are not seen, but she just had an U/S in february and it was present and in position then. Does not want to repeat the U/S.

## 2017-09-11 LAB
GEN CATEG CVX/VAG CYTO-IMP: NORMAL
LAB AP CASE REPORT: NORMAL
Lab: NORMAL
PATH INTERP SPEC-IMP: NORMAL
STAT OF ADQ CVX/VAG CYTO-IMP: NORMAL

## 2017-09-15 LAB
25(OH)D3+25(OH)D2 SERPL-MCNC: 30.5 NG/ML (ref 30–100)
ALBUMIN SERPL-MCNC: 4.6 G/DL (ref 3.5–5)
ALBUMIN/GLOB SERPL: 1.6 G/DL (ref 1.1–2.5)
ALP SERPL-CCNC: 81 U/L (ref 24–120)
ALT SERPL-CCNC: 27 U/L (ref 0–54)
AST SERPL-CCNC: 19 U/L (ref 7–45)
BASOPHILS # BLD AUTO: 0.02 10*3/MM3 (ref 0–0.2)
BASOPHILS NFR BLD AUTO: 0.3 % (ref 0–2)
BILIRUB SERPL-MCNC: 1 MG/DL (ref 0.1–1)
BUN SERPL-MCNC: 15 MG/DL (ref 5–21)
BUN/CREAT SERPL: 18.8 (ref 7–25)
CALCIUM SERPL-MCNC: 9 MG/DL (ref 8.4–10.4)
CHLORIDE SERPL-SCNC: 103 MMOL/L (ref 98–110)
CHOLEST SERPL-MCNC: 168 MG/DL (ref 130–200)
CO2 SERPL-SCNC: 25 MMOL/L (ref 24–31)
CREAT SERPL-MCNC: 0.8 MG/DL (ref 0.5–1.4)
EOSINOPHIL # BLD AUTO: 0.22 10*3/MM3 (ref 0–0.7)
EOSINOPHIL NFR BLD AUTO: 3.4 % (ref 0–4)
ERYTHROCYTE [DISTWIDTH] IN BLOOD BY AUTOMATED COUNT: 12.7 % (ref 12–15)
GLOBULIN SER CALC-MCNC: 2.8 GM/DL
GLUCOSE SERPL-MCNC: 78 MG/DL (ref 70–100)
HBA1C MFR BLD: 4.8 %
HCT VFR BLD AUTO: 41.8 % (ref 37–47)
HDLC SERPL-MCNC: 47 MG/DL
HGB BLD-MCNC: 13.9 G/DL (ref 12–16)
IMM GRANULOCYTES # BLD: 0.01 10*3/MM3 (ref 0–0.03)
IMM GRANULOCYTES NFR BLD: 0.2 % (ref 0–5)
LDLC SERPL CALC-MCNC: 102 MG/DL (ref 0–99)
LDLC/HDLC SERPL: 2.16 {RATIO}
LYMPHOCYTES # BLD AUTO: 2.23 10*3/MM3 (ref 0.72–4.86)
LYMPHOCYTES NFR BLD AUTO: 34.6 % (ref 15–45)
MCH RBC QN AUTO: 30 PG (ref 28–32)
MCHC RBC AUTO-ENTMCNC: 33.3 G/DL (ref 33–36)
MCV RBC AUTO: 90.3 FL (ref 82–98)
MONOCYTES # BLD AUTO: 0.5 10*3/MM3 (ref 0.19–1.3)
MONOCYTES NFR BLD AUTO: 7.8 % (ref 4–12)
NEUTROPHILS # BLD AUTO: 3.46 10*3/MM3 (ref 1.87–8.4)
NEUTROPHILS NFR BLD AUTO: 53.7 % (ref 39–78)
PLATELET # BLD AUTO: 246 10*3/MM3 (ref 130–400)
POTASSIUM SERPL-SCNC: 4.4 MMOL/L (ref 3.5–5.3)
PROT SERPL-MCNC: 7.4 G/DL (ref 6.3–8.7)
RBC # BLD AUTO: 4.63 10*6/MM3 (ref 4.2–5.4)
SODIUM SERPL-SCNC: 140 MMOL/L (ref 135–145)
T3RU NFR SERPL: 34 % (ref 24–39)
T4 FREE SERPL-MCNC: 1.08 NG/DL (ref 0.78–2.19)
TRIGL SERPL-MCNC: 97 MG/DL (ref 0–149)
TSH SERPL DL<=0.005 MIU/L-ACNC: 1.08 MIU/ML (ref 0.47–4.68)
VLDLC SERPL CALC-MCNC: 19.4 MG/DL
WBC # BLD AUTO: 6.44 10*3/MM3 (ref 4.8–10.8)

## 2017-09-25 ENCOUNTER — DOCUMENTATION (OUTPATIENT)
Dept: OBSTETRICS AND GYNECOLOGY | Facility: CLINIC | Age: 32
End: 2017-09-25

## 2017-10-06 ENCOUNTER — OFFICE VISIT (OUTPATIENT)
Dept: OBSTETRICS AND GYNECOLOGY | Facility: CLINIC | Age: 32
End: 2017-10-06

## 2017-10-06 VITALS
SYSTOLIC BLOOD PRESSURE: 112 MMHG | BODY MASS INDEX: 26.05 KG/M2 | DIASTOLIC BLOOD PRESSURE: 66 MMHG | WEIGHT: 147 LBS | HEIGHT: 63 IN

## 2017-10-06 DIAGNOSIS — F32.9 REACTIVE DEPRESSION: ICD-10-CM

## 2017-10-06 DIAGNOSIS — F41.9 ANXIETY: Primary | ICD-10-CM

## 2017-10-06 DIAGNOSIS — E55.9 VITAMIN D DEFICIENCY: ICD-10-CM

## 2017-10-06 PROCEDURE — 99213 OFFICE O/P EST LOW 20 MIN: CPT | Performed by: NURSE PRACTITIONER

## 2017-10-06 NOTE — PROGRESS NOTES
"Subjective   Yadira Wallace is a 32 y.o. female     HPI Comments: Here for follow up on Anxiety and depression. She feels the Wellbutrin has helped. Has not taken the Xanax as she was afraid to. Encouraged her to take if needed,    Anxiety   Presents for follow-up visit. Symptoms include depressed mood, excessive worry and nervous/anxious behavior. Patient reports no chest pain, compulsions, confusion, decreased concentration, dizziness, dry mouth, feeling of choking, hyperventilation, impotence, insomnia, irritability, malaise, muscle tension, nausea, obsessions, palpitations, panic, restlessness, shortness of breath or suicidal ideas. Symptoms occur most days. The severity of symptoms is mild. The quality of sleep is good. Nighttime awakenings: occasional.     Compliance with medications is %.             /66  Ht 63\" (160 cm)  Wt 147 lb (66.7 kg)  LMP 10/01/2017 (Exact Date)  BMI 26.04 kg/m2      Outpatient Encounter Prescriptions as of 10/6/2017   Medication Sig Dispense Refill   • azelastine (ASTELIN) 0.1 % nasal spray      • buPROPion XL (WELLBUTRIN XL) 150 MG 24 hr tablet Take 1 tablet by mouth Daily. 30 tablet 12   • fluticasone (FLONASE) 50 MCG/ACT nasal spray      • levonorgestrel (MIRENA, 52 MG,) 20 MCG/24HR IUD 1 each by Intrauterine route 1 (One) Time. 2016     • SUMAtriptan (IMITREX) 100 MG tablet Take 1 tablet by mouth 1 (One) Time As Needed for Migraine for up to 1 dose. 12 tablet 6   • ALPRAZolam (XANAX) 0.25 MG tablet Take 1 tablet by mouth 3 (Three) Times a Day As Needed for Anxiety. 90 tablet 0     No facility-administered encounter medications on file as of 10/6/2017.            Surgical History  Past Surgical History:   Procedure Laterality Date   •  SECTION     • MYRINGOTOMY W/ TUBES Bilateral     x2 (as a child)         Family History  Family History   Problem Relation Age of Onset   • Hypertension Father    • Diabetes Father      type 1   • Other Father      CABG   • " Coronary artery disease Father    • Diabetes Sister      Type 1, In stable health   • No Known Problems Mother    • No Known Problems Brother    • Breast cancer Neg Hx    • Ovarian cancer Neg Hx    • Uterine cancer Neg Hx    • Colon cancer Neg Hx    • Melanoma Neg Hx    • Prostate cancer Neg Hx              The following portions of the patient's history were reviewed and updated as appropriate: allergies, current medications, past family history, past medical history, past social history, past surgical history and problem list.    Review of Systems   Constitutional: Negative for activity change, appetite change, chills, diaphoresis, fatigue, fever, irritability and unexpected weight change.   HENT: Negative for congestion, ear discharge, ear pain, facial swelling, hearing loss, mouth sores, nosebleeds, postnasal drip, rhinorrhea, sinus pressure, sneezing, sore throat, tinnitus, trouble swallowing and voice change.    Eyes: Negative for photophobia, pain, discharge, redness, itching and visual disturbance.   Respiratory: Negative for apnea, cough, choking, chest tightness and shortness of breath.    Cardiovascular: Negative for chest pain, palpitations and leg swelling.   Gastrointestinal: Negative for abdominal distention, abdominal pain, anal bleeding, blood in stool, constipation, diarrhea, nausea, rectal pain and vomiting.   Endocrine: Negative for cold intolerance and heat intolerance.   Genitourinary: Negative for decreased urine volume, difficulty urinating, dyspareunia, flank pain, frequency, genital sores, hematuria, impotence, menstrual problem, pelvic pain, urgency, vaginal bleeding, vaginal discharge and vaginal pain.   Musculoskeletal: Negative for arthralgias, back pain, joint swelling and myalgias.   Skin: Negative for color change and rash.   Allergic/Immunologic: Negative for environmental allergies.   Neurological: Negative for dizziness, syncope, weakness, numbness and headaches.   Hematological:  Negative for adenopathy.   Psychiatric/Behavioral: Negative for agitation, confusion, decreased concentration, sleep disturbance and suicidal ideas. The patient is nervous/anxious. The patient does not have insomnia.              Objective   Physical Exam   Constitutional: She is oriented to person, place, and time. She appears well-developed and well-nourished.   HENT:   Head: Normocephalic and atraumatic.   Eyes: Conjunctivae are normal. Right eye exhibits no discharge. Left eye exhibits no discharge.   Neck: Normal range of motion. Neck supple. No thyromegaly present.   Cardiovascular: Normal rate, regular rhythm and normal heart sounds.    No murmur heard.  Abdominal: Soft.   Neurological: She is alert and oriented to person, place, and time.   Skin: Skin is warm and dry.   Psychiatric: She has a normal mood and affect. Her behavior is normal. Judgment and thought content normal.   Nursing note and vitals reviewed.        Assessment/Plan   Yadira was seen today for med refill.    Diagnoses and all orders for this visit:    Anxiety  Comments:  Doing better with Anxiety. Has not taken her Xanax. Encouraged to take it if needed.    Reactive depression  Comments:  Feels the Wellbutrin is working. Will continue. RTO in 3 months.    Vitamin D deficiency  Comments:  Has started her Vitamin D and will recheck her levels in 3 months.

## 2017-11-28 ENCOUNTER — HOSPITAL ENCOUNTER (OUTPATIENT)
Dept: GENERAL RADIOLOGY | Age: 32
Discharge: HOME | End: 2017-11-28
Payer: COMMERCIAL

## 2017-11-28 ENCOUNTER — OFFICE VISIT (OUTPATIENT)
Dept: URGENT CARE | Age: 32
End: 2017-11-28
Payer: COMMERCIAL

## 2017-11-28 VITALS
BODY MASS INDEX: 26.72 KG/M2 | RESPIRATION RATE: 20 BRPM | OXYGEN SATURATION: 99 % | WEIGHT: 150.8 LBS | HEART RATE: 78 BPM | HEIGHT: 63 IN | DIASTOLIC BLOOD PRESSURE: 71 MMHG | TEMPERATURE: 98.3 F | SYSTOLIC BLOOD PRESSURE: 111 MMHG

## 2017-11-28 DIAGNOSIS — M25.562 ACUTE PAIN OF LEFT KNEE: ICD-10-CM

## 2017-11-28 DIAGNOSIS — M25.562 ACUTE PAIN OF LEFT KNEE: Primary | ICD-10-CM

## 2017-11-28 PROCEDURE — 73560 X-RAY EXAM OF KNEE 1 OR 2: CPT

## 2017-11-28 PROCEDURE — 99213 OFFICE O/P EST LOW 20 MIN: CPT | Performed by: NURSE PRACTITIONER

## 2017-11-28 RX ORDER — MELOXICAM 15 MG/1
15 TABLET ORAL DAILY
Qty: 30 TABLET | Refills: 0 | Status: SHIPPED | OUTPATIENT
Start: 2017-11-28 | End: 2018-04-11 | Stop reason: CLARIF

## 2017-11-28 RX ORDER — BUPROPION HYDROCHLORIDE 150 MG/1
150 TABLET ORAL DAILY
Refills: 12 | COMMUNITY
Start: 2017-11-03 | End: 2018-04-11

## 2017-11-28 RX ORDER — SUMATRIPTAN 100 MG/1
100 TABLET, FILM COATED ORAL PRN
Refills: 6 | COMMUNITY
Start: 2017-11-02 | End: 2018-04-17 | Stop reason: SDUPTHER

## 2017-11-28 RX ORDER — ALPRAZOLAM 0.25 MG/1
0.25 TABLET ORAL PRN
Refills: 0 | COMMUNITY
Start: 2017-09-07 | End: 2018-04-11 | Stop reason: CLARIF

## 2017-11-28 RX ORDER — FLUTICASONE PROPIONATE 50 MCG
1 SPRAY, SUSPENSION (ML) NASAL PRN
COMMUNITY
Start: 2017-09-06 | End: 2018-10-23 | Stop reason: CLARIF

## 2017-11-28 RX ORDER — AZELASTINE 1 MG/ML
1 SPRAY, METERED NASAL PRN
COMMUNITY
Start: 2017-09-06 | End: 2018-10-23 | Stop reason: CLARIF

## 2017-11-28 ASSESSMENT — ENCOUNTER SYMPTOMS
BACK PAIN: 0
RESPIRATORY NEGATIVE: 1

## 2017-11-28 NOTE — PROGRESS NOTES
[address] [address]  Dept: [phone]  Loc: [phone]    Debbie York is a 29 y.o. female who presents today for her medical conditions/complaints as noted below. Debbie York is c/o of Knee Pain (Left Knee Pain- Patient states that it started last night after playing in the floor with her daughters- no inury noted and does not recall doing anything to it- hurt worse throught the night and could not bend it or straighten it out- still hurting today.)        HPI:     HPI  Pt presents to clinic with c/o left knee pain. States she was in the floor playing with her kids last night and when she got up it was stiff and painful. Denies injury mechanism. States she is having trouble bending it or straightening it all the way. Denies swelling. States that it hurt all night long. States she took ibuprofen. States she can put weight on her knee but can't bend it at all. Rates pain 7/10. POCT xray left knee: normal    Past Medical History:   Diagnosis Date    Anxiety     Migraines       Past Surgical History:   Procedure Laterality Date     SECTION         Family History   Problem Relation Age of Onset    Diabetes Father     Heart Disease Father     Diabetes Sister        Social History   Substance Use Topics    Smoking status: Never Smoker    Smokeless tobacco: Never Used    Alcohol use Yes      Comment: OCC. Current Outpatient Prescriptions   Medication Sig Dispense Refill    ALPRAZolam (XANAX) 0.25 MG tablet Take 0.25 mg by mouth as needed .   0    buPROPion (WELLBUTRIN XL) 150 MG extended release tablet Take 150 mg by mouth daily  12    SUMAtriptan (IMITREX) 100 MG tablet Take 100 mg by mouth as needed  6    azelastine (ASTELIN) 0.1 % nasal spray 1 spray by Nasal route as needed      fluticasone (FLONASE) 50 MCG/ACT nasal spray 1 spray by Nasal route as needed       No current facility-administered medications for this visit. No Known Allergies    Health Maintenance   Topic Date Due    HIV screen  08/29/2002    DTaP/Tdap/Td vaccine (1 - Tdap) 08/29/2006    Cervical cancer screen  08/29/2008    Flu vaccine (1) 09/01/2017       Subjective:      Review of Systems   Constitutional: Negative. Respiratory: Negative. Cardiovascular: Negative. Musculoskeletal: Positive for gait problem. Negative for back pain, joint swelling, neck pain and neck stiffness. Knee pain   Skin: Negative. Psychiatric/Behavioral: Negative. Objective:     Physical Exam   Constitutional: She is oriented to person, place, and time. Vital signs are normal. She appears well-developed and well-nourished. Neck: Normal range of motion. Cardiovascular: Normal rate and regular rhythm. Pulmonary/Chest: Effort normal and breath sounds normal.   Musculoskeletal: She exhibits tenderness. Right shoulder: She exhibits normal range of motion, no tenderness, no swelling, no deformity, no laceration, no pain, normal pulse and normal strength. Left knee: She exhibits decreased range of motion. Tenderness found. Medial joint line tenderness noted. Neurological: She is alert and oriented to person, place, and time. Skin: Skin is warm, dry and intact. Psychiatric: She has a normal mood and affect. Her speech is normal and behavior is normal. Judgment and thought content normal. Cognition and memory are normal.   Nursing note and vitals reviewed. /71   Pulse 78   Temp 98.3 °F (36.8 °C) (Oral)   Resp 20   Ht 5' 3\" (1.6 m)   Wt 150 lb 12.8 oz (68.4 kg)   SpO2 99%   BMI 26.71 kg/m²     Assessment:      1.  Acute pain of left knee  XR KNEE LEFT (1-2 VIEWS)       Plan:      Orders Placed This Encounter   Procedures    XR KNEE LEFT (1-2 VIEWS)     Standing Status:   Future     Standing Expiration Date:   11/28/2018     Order Specific Question:   Reason for exam:

## 2017-11-28 NOTE — PATIENT INSTRUCTIONS
1. Rest, ice, compress, elevate  2. Wear ace when up and about  3. Ice 20 min on 20 min off today. Then ice/heat 4 times a day  4. Take mobic as directed  5.  Return to clinic if symptoms worsen or fail to improve

## 2018-04-09 ENCOUNTER — TELEPHONE (OUTPATIENT)
Dept: OBSTETRICS AND GYNECOLOGY | Facility: CLINIC | Age: 33
End: 2018-04-09

## 2018-04-09 NOTE — TELEPHONE ENCOUNTER
Pt called saying her Imitrex is not working well anymore and is asking if she can be started on another medication. I let pt know she needs to be seen. Pt is asking to be seen soon I let her know dawson bob is out and she can make an appt with another provider or she can see a pcp. She does not have a pcp and said she sees dawson for her migraines. Connected pt with scheduling

## 2018-04-11 ENCOUNTER — OFFICE VISIT (OUTPATIENT)
Dept: INTERNAL MEDICINE | Age: 33
End: 2018-04-11
Payer: COMMERCIAL

## 2018-04-11 VITALS
SYSTOLIC BLOOD PRESSURE: 118 MMHG | HEART RATE: 70 BPM | HEIGHT: 63 IN | DIASTOLIC BLOOD PRESSURE: 78 MMHG | OXYGEN SATURATION: 99 % | RESPIRATION RATE: 18 BRPM | BODY MASS INDEX: 26.75 KG/M2 | WEIGHT: 151 LBS

## 2018-04-11 DIAGNOSIS — F41.1 GENERALIZED ANXIETY DISORDER: ICD-10-CM

## 2018-04-11 DIAGNOSIS — G47.9 SLEEP DISTURBANCE: ICD-10-CM

## 2018-04-11 DIAGNOSIS — G43.909 MIGRAINE SYNDROME: ICD-10-CM

## 2018-04-11 DIAGNOSIS — R53.83 OTHER FATIGUE: ICD-10-CM

## 2018-04-11 DIAGNOSIS — E55.9 VITAMIN D DEFICIENCY: ICD-10-CM

## 2018-04-11 PROBLEM — Z00.00 ANNUAL PHYSICAL EXAM: Status: ACTIVE | Noted: 2018-04-11

## 2018-04-11 PROCEDURE — 99204 OFFICE O/P NEW MOD 45 MIN: CPT | Performed by: INTERNAL MEDICINE

## 2018-04-11 RX ORDER — MULTIVIT-MIN/IRON/FOLIC ACID/K 18-600-40
1 CAPSULE ORAL DAILY
COMMUNITY

## 2018-04-11 RX ORDER — DESVENLAFAXINE 50 MG/1
50 TABLET, EXTENDED RELEASE ORAL DAILY
Qty: 30 TABLET | Refills: 3 | Status: SHIPPED | OUTPATIENT
Start: 2018-04-11 | End: 2018-05-31 | Stop reason: SDUPTHER

## 2018-04-11 ASSESSMENT — ENCOUNTER SYMPTOMS
COUGH: 0
CHEST TIGHTNESS: 0
NAUSEA: 0
DIARRHEA: 0
CONSTIPATION: 0
COLOR CHANGE: 0
ABDOMINAL PAIN: 0
VOMITING: 0
EYE PAIN: 0
WHEEZING: 0
SORE THROAT: 0
SINUS PRESSURE: 0
EYE REDNESS: 0
VOICE CHANGE: 0
BLOOD IN STOOL: 0
TROUBLE SWALLOWING: 0

## 2018-04-11 ASSESSMENT — PATIENT HEALTH QUESTIONNAIRE - PHQ9
SUM OF ALL RESPONSES TO PHQ QUESTIONS 1-9: 2
SUM OF ALL RESPONSES TO PHQ9 QUESTIONS 1 & 2: 2
1. LITTLE INTEREST OR PLEASURE IN DOING THINGS: 1
2. FEELING DOWN, DEPRESSED OR HOPELESS: 1

## 2018-04-17 RX ORDER — SUMATRIPTAN 100 MG/1
100 TABLET, FILM COATED ORAL PRN
Qty: 30 TABLET | Refills: 6 | Status: SHIPPED | OUTPATIENT
Start: 2018-04-17 | End: 2018-05-15 | Stop reason: SDUPTHER

## 2018-05-11 PROBLEM — Z00.00 ANNUAL PHYSICAL EXAM: Status: RESOLVED | Noted: 2018-04-11 | Resolved: 2018-05-11

## 2018-05-15 RX ORDER — SUMATRIPTAN 100 MG/1
100 TABLET, FILM COATED ORAL PRN
Qty: 30 TABLET | Refills: 6 | Status: SHIPPED | OUTPATIENT
Start: 2018-05-15 | End: 2018-12-17 | Stop reason: SDUPTHER

## 2018-05-31 ENCOUNTER — OFFICE VISIT (OUTPATIENT)
Dept: INTERNAL MEDICINE | Age: 33
End: 2018-05-31
Payer: COMMERCIAL

## 2018-05-31 VITALS
WEIGHT: 153.2 LBS | DIASTOLIC BLOOD PRESSURE: 68 MMHG | OXYGEN SATURATION: 98 % | SYSTOLIC BLOOD PRESSURE: 120 MMHG | HEART RATE: 78 BPM | HEIGHT: 64 IN | BODY MASS INDEX: 26.15 KG/M2

## 2018-05-31 DIAGNOSIS — E55.9 VITAMIN D DEFICIENCY: ICD-10-CM

## 2018-05-31 DIAGNOSIS — G47.9 SLEEP DISTURBANCE: ICD-10-CM

## 2018-05-31 DIAGNOSIS — F41.1 GENERALIZED ANXIETY DISORDER: Primary | ICD-10-CM

## 2018-05-31 DIAGNOSIS — Z00.00 ANNUAL PHYSICAL EXAM: ICD-10-CM

## 2018-05-31 DIAGNOSIS — G43.909 MIGRAINE SYNDROME: ICD-10-CM

## 2018-05-31 PROCEDURE — 99213 OFFICE O/P EST LOW 20 MIN: CPT | Performed by: INTERNAL MEDICINE

## 2018-05-31 RX ORDER — DESVENLAFAXINE 50 MG/1
50 TABLET, EXTENDED RELEASE ORAL DAILY
Qty: 30 TABLET | Refills: 10 | Status: SHIPPED | OUTPATIENT
Start: 2018-05-31 | End: 2018-12-07 | Stop reason: SDUPTHER

## 2018-05-31 ASSESSMENT — ENCOUNTER SYMPTOMS
VOICE CHANGE: 0
DIARRHEA: 0
WHEEZING: 0
CHEST TIGHTNESS: 0
BLOOD IN STOOL: 0
EYE REDNESS: 0
COLOR CHANGE: 0
EYE PAIN: 0
ABDOMINAL PAIN: 0
TROUBLE SWALLOWING: 0
COUGH: 0
SORE THROAT: 0
VOMITING: 0
CONSTIPATION: 0
NAUSEA: 0
SINUS PRESSURE: 0

## 2018-06-30 PROBLEM — Z00.00 ANNUAL PHYSICAL EXAM: Status: RESOLVED | Noted: 2018-04-11 | Resolved: 2018-06-30

## 2018-07-07 ENCOUNTER — HOSPITAL ENCOUNTER (EMERGENCY)
Age: 33
Discharge: HOME | End: 2018-07-07
Payer: COMMERCIAL

## 2018-07-07 VITALS
RESPIRATION RATE: 18 BRPM | HEIGHT: 63 IN | TEMPERATURE: 98.2 F | DIASTOLIC BLOOD PRESSURE: 97 MMHG | BODY MASS INDEX: 26.58 KG/M2 | OXYGEN SATURATION: 96 % | HEART RATE: 76 BPM | SYSTOLIC BLOOD PRESSURE: 121 MMHG | WEIGHT: 150 LBS

## 2018-07-07 DIAGNOSIS — G43.709 CHRONIC MIGRAINE WITHOUT AURA WITHOUT STATUS MIGRAINOSUS, NOT INTRACTABLE: Primary | ICD-10-CM

## 2018-07-07 PROCEDURE — 99284 EMERGENCY DEPT VISIT MOD MDM: CPT | Performed by: NURSE PRACTITIONER

## 2018-07-07 PROCEDURE — 99282 EMERGENCY DEPT VISIT SF MDM: CPT

## 2018-07-07 PROCEDURE — 6360000002 HC RX W HCPCS: Performed by: NURSE PRACTITIONER

## 2018-07-07 PROCEDURE — 96372 THER/PROPH/DIAG INJ SC/IM: CPT

## 2018-07-07 RX ORDER — ONDANSETRON 4 MG/1
4 TABLET, ORALLY DISINTEGRATING ORAL ONCE
Status: COMPLETED | OUTPATIENT
Start: 2018-07-07 | End: 2018-07-07

## 2018-07-07 RX ORDER — KETOROLAC TROMETHAMINE 30 MG/ML
60 INJECTION, SOLUTION INTRAMUSCULAR; INTRAVENOUS ONCE
Status: COMPLETED | OUTPATIENT
Start: 2018-07-07 | End: 2018-07-07

## 2018-07-07 RX ORDER — ORPHENADRINE CITRATE 30 MG/ML
60 INJECTION INTRAMUSCULAR; INTRAVENOUS ONCE
Status: COMPLETED | OUTPATIENT
Start: 2018-07-07 | End: 2018-07-07

## 2018-07-07 RX ADMIN — ONDANSETRON 4 MG: 4 TABLET, ORALLY DISINTEGRATING ORAL at 07:58

## 2018-07-07 RX ADMIN — KETOROLAC TROMETHAMINE 60 MG: 30 INJECTION, SOLUTION INTRAMUSCULAR at 07:57

## 2018-07-07 RX ADMIN — ORPHENADRINE CITRATE 60 MG: 30 INJECTION INTRAMUSCULAR; INTRAVENOUS at 07:57

## 2018-07-07 ASSESSMENT — ENCOUNTER SYMPTOMS
COUGH: 0
VISUAL CHANGE: 0
BACK PAIN: 1
SORE THROAT: 0
VOMITING: 0
PHOTOPHOBIA: 1

## 2018-07-07 ASSESSMENT — PAIN SCALES - GENERAL
PAINLEVEL_OUTOF10: 8
PAINLEVEL_OUTOF10: 5

## 2018-07-07 NOTE — ED PROVIDER NOTES
[facility]  eMERGENCY dEPARTMENT eNCOUnter      Pt Name: Ramo Martel  MRN: [id number]  [place] 1985  Date of evaluation: 7/7/2018  Provider: Christopher Block, [address]       Chief Complaint   Patient presents with    Headache         HISTORY OF PRESENT ILLNESS   (Location/Symptom, Timing/Onset, Context/Setting, Quality, Duration, Modifying Factors, Severity)  Note limiting factors. Ramo Martel is a 29 y.o. female who presents to the emergency department with a migraine. Started 2 days ago. Took her immitrex without relief. NO fever. Centered around her left eye. Neck feels tense. No vomiting. NO vision changes. Has a long history of migrianes    The history is provided by the patient. Headache   Pain location:  L temporal  Quality:  Sharp and stabbing  Onset quality:  Sudden  Duration:  2 days  Timing:  Constant  Progression:  Worsening  Chronicity:  Recurrent  Similar to prior headaches: yes (but worse)    Associated symptoms: back pain, neck stiffness and photophobia    Associated symptoms: no cough, no dizziness, no fever, no focal weakness, no loss of balance, no sore throat, no URI, no visual change and no vomiting        Nursing Notes were reviewed. REVIEW OF SYSTEMS    (2-9 systems for level 4, 10 or more for level 5)     Review of Systems   Constitutional: Negative for fever. HENT: Negative for sore throat. Eyes: Positive for photophobia. Respiratory: Negative for cough. Gastrointestinal: Negative for vomiting. Musculoskeletal: Positive for back pain and neck stiffness. Neurological: Positive for headaches. Negative for dizziness, focal weakness and loss of balance. Except as noted above the remainder of the review of systems was reviewed and negative.        PAST MEDICAL HISTORY     Past Medical History:   Diagnosis Date    Anxiety     Depression     Migraines          SURGICAL HISTORY       Past Surgical History:   Procedure Laterality Date     SECTION           CURRENT MEDICATIONS       Discharge Medication List as of 2018  8:50 AM      CONTINUE these medications which have NOT CHANGED    Details   desvenlafaxine succinate (PRISTIQ) 50 MG TB24 extended release tablet Take 1 tablet by mouth daily, Disp-30 tablet, R-10Normal      SUMAtriptan (IMITREX) 100 MG tablet Take 1 tablet by mouth as needed (Take one tablet by mouth daily as needed), Disp-30 tablet, R-6Normal      Cholecalciferol (VITAMIN D) 2000 units CAPS capsule Take 1 capsule by mouth dailyHistorical Med      azelastine (ASTELIN) 0.1 % nasal spray 1 spray by Nasal route as neededHistorical Med      fluticasone (FLONASE) 50 MCG/ACT nasal spray 1 spray by Nasal route as neededHistorical Med             ALLERGIES     Patient has no known allergies. 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      Social History Main Topics    Smoking status: Never Smoker    Smokeless tobacco: Never Used    Alcohol use Yes      Comment: Rarely    Drug use: No    Sexual activity: Yes     Partners: Male     Other Topics Concern    None     Social History Narrative    None       SCREENINGS             PHYSICAL EXAM    (up to 7 for level 4, 8 or more for level 5)     ED Triage Vitals [18 0712]   BP Temp Temp Source Pulse Resp SpO2 Height Weight   (!) 121/97 98.2 °F (36.8 °C) Oral 76 18 96 % 5' 3\" (1.6 m) 150 lb (68 kg)       Physical Exam   Constitutional: She is oriented to person, place, and time. She appears well-developed and well-nourished. HENT:   Head: Normocephalic and atraumatic. Eyes: Conjunctivae and EOM are normal. Pupils are equal, round, and reactive to light.  Right eye 7/7/2018  8:50 AM             (Please note that portions of this note were completed with a voice recognition program.  Efforts were made to edit the dictations but occasionally words are mis-transcribed.)    Gerhardt Reels, APRN (electronically signed)          Gerhardt Reels, APRN  07/07/18 [id number]

## 2018-10-18 ENCOUNTER — PATIENT MESSAGE (OUTPATIENT)
Dept: INTERNAL MEDICINE | Age: 33
End: 2018-10-18

## 2018-10-19 NOTE — TELEPHONE ENCOUNTER
From: Quinten Simon  To: Imani Barclay MD  Sent: 10/18/2018 12:14 PM CDT  Subject: Non-Urgent Jasper Alstrom Dr Alia Calderon! We talked at my last visit about my migraines. They had gotten better after I switched to my [address], but lately they have become some so frequent. In the last month, I've had one about 4-5 days per week. Imitrex has helped, but I feel like I am overdosing myself. I went through 9 pills in 8 days!!! I'm not sure what to do at this point. The medicine makes it go away, but then once it wears off it just comes right back. I went to the ER a few months ago because I had the worst migraine I've ever had! It last 4 days and just got worse every day. It was terrible! I basically have to get my prescription filled every month if not sooner. I just wanted to contact you to see what you thought we should do. Any advice would be great! Thanks!

## 2018-10-23 ENCOUNTER — OFFICE VISIT (OUTPATIENT)
Dept: INTERNAL MEDICINE | Age: 33
End: 2018-10-23
Payer: COMMERCIAL

## 2018-10-23 VITALS
WEIGHT: 154 LBS | BODY MASS INDEX: 27.29 KG/M2 | RESPIRATION RATE: 18 BRPM | SYSTOLIC BLOOD PRESSURE: 110 MMHG | DIASTOLIC BLOOD PRESSURE: 72 MMHG | HEIGHT: 63 IN

## 2018-10-23 DIAGNOSIS — Z23 NEED FOR IMMUNIZATION AGAINST INFLUENZA: ICD-10-CM

## 2018-10-23 DIAGNOSIS — F43.21 FEELING GRIEF: ICD-10-CM

## 2018-10-23 DIAGNOSIS — G43.909 MIGRAINE SYNDROME: ICD-10-CM

## 2018-10-23 DIAGNOSIS — F41.1 GENERALIZED ANXIETY DISORDER: Primary | ICD-10-CM

## 2018-10-23 PROCEDURE — 99214 OFFICE O/P EST MOD 30 MIN: CPT | Performed by: INTERNAL MEDICINE

## 2018-10-23 PROCEDURE — 90471 IMMUNIZATION ADMIN: CPT | Performed by: INTERNAL MEDICINE

## 2018-10-23 PROCEDURE — 90686 IIV4 VACC NO PRSV 0.5 ML IM: CPT | Performed by: INTERNAL MEDICINE

## 2018-10-23 RX ORDER — TOPIRAMATE 50 MG/1
TABLET, FILM COATED ORAL
Qty: 30 TABLET | Refills: 3 | Status: SHIPPED | OUTPATIENT
Start: 2018-10-23 | End: 2019-01-23

## 2018-10-23 ASSESSMENT — ENCOUNTER SYMPTOMS
SORE THROAT: 0
CONSTIPATION: 0
ABDOMINAL PAIN: 0
CHEST TIGHTNESS: 0
WHEEZING: 0
COUGH: 0

## 2018-10-23 NOTE — PROGRESS NOTES
is feeling sad but she feels that is able to handle things well  Suggest she continue current Pristiq dose 50 mg daily    Migraine syndrome  She had migraines for several years but recently they are much more frequent, they get better with Imitrex but then return after 5-6 hours  There Is no change in quality location or change in associated symptoms with these headaches    Suggest a trial of Topamax  Start 50 mg half tablet daily  In 10 days increase to full tablet daily  If has any issues with this medication, if the medication does not help she would need to call us for advice      Need for immunization against influenza  -     INFLUENZA, QUADV, 3 YRS AND OLDER, IM, PF, PREFILL SYR OR SDV, 0.5ML (FLUZONE QUADV, PF)    Other orders  -     topiramate (TOPAMAX) 50 MG tablet; One daily bedtime              Orders Placed This Encounter   Procedures    INFLUENZA, QUADV, 3 YRS AND OLDER, IM, PF, PREFILL SYR OR SDV, 0.5ML (FLUZONE QUADV, PF)     New Prescriptions    TOPIRAMATE (TOPAMAX) 50 MG TABLET    One daily bedtime        No Follow-up on file. There are no Patient Instructions on file for this visit. EMR Dragon/transcription disclaimer:Significant part of this  encounter note is electronic transcription/translationof spoken language to printed text. The electronic translation of spoken language may be erroneous, or at times, nonsensical words or phrases may be inadvertently transcribed.  Although I have reviewed the note for sucherrors, some may still exist.

## 2018-12-07 RX ORDER — DESVENLAFAXINE 50 MG/1
50 TABLET, EXTENDED RELEASE ORAL DAILY
Qty: 90 TABLET | Refills: 3 | Status: SHIPPED | OUTPATIENT
Start: 2018-12-07 | End: 2018-12-17

## 2018-12-17 ENCOUNTER — OFFICE VISIT (OUTPATIENT)
Dept: INTERNAL MEDICINE | Age: 33
End: 2018-12-17
Payer: COMMERCIAL

## 2018-12-17 VITALS
DIASTOLIC BLOOD PRESSURE: 68 MMHG | OXYGEN SATURATION: 99 % | HEART RATE: 81 BPM | BODY MASS INDEX: 27.82 KG/M2 | RESPIRATION RATE: 18 BRPM | HEIGHT: 63 IN | WEIGHT: 157 LBS | SYSTOLIC BLOOD PRESSURE: 100 MMHG

## 2018-12-17 DIAGNOSIS — G47.9 SLEEP DISTURBANCE: ICD-10-CM

## 2018-12-17 DIAGNOSIS — F41.1 GENERALIZED ANXIETY DISORDER: ICD-10-CM

## 2018-12-17 DIAGNOSIS — G43.909 MIGRAINE SYNDROME: Primary | ICD-10-CM

## 2018-12-17 PROCEDURE — 99214 OFFICE O/P EST MOD 30 MIN: CPT | Performed by: INTERNAL MEDICINE

## 2018-12-17 RX ORDER — BUTALBITAL, ACETAMINOPHEN AND CAFFEINE 50; 325; 40 MG/1; MG/1; MG/1
1 TABLET ORAL 2 TIMES DAILY PRN
Qty: 40 TABLET | Refills: 1 | Status: SHIPPED | OUTPATIENT
Start: 2018-12-17 | End: 2019-03-05

## 2018-12-17 RX ORDER — SUMATRIPTAN 100 MG/1
100 TABLET, FILM COATED ORAL PRN
Qty: 30 TABLET | Refills: 6 | Status: SHIPPED | OUTPATIENT
Start: 2018-12-17 | End: 2019-11-20 | Stop reason: SDUPTHER

## 2018-12-17 ASSESSMENT — ENCOUNTER SYMPTOMS
SORE THROAT: 0
COUGH: 0
CHEST TIGHTNESS: 0
CONSTIPATION: 0
WHEEZING: 0
ABDOMINAL PAIN: 0

## 2019-01-08 RX ORDER — ESCITALOPRAM OXALATE 10 MG/1
TABLET ORAL
Qty: 30 TABLET | Refills: 2 | Status: SHIPPED | OUTPATIENT
Start: 2019-01-08 | End: 2019-01-31 | Stop reason: SDUPTHER

## 2019-01-23 ENCOUNTER — OFFICE VISIT (OUTPATIENT)
Dept: NEUROLOGY | Age: 34
End: 2019-01-23
Payer: COMMERCIAL

## 2019-01-23 VITALS
WEIGHT: 161 LBS | SYSTOLIC BLOOD PRESSURE: 105 MMHG | HEART RATE: 74 BPM | HEIGHT: 63 IN | BODY MASS INDEX: 28.53 KG/M2 | DIASTOLIC BLOOD PRESSURE: 69 MMHG

## 2019-01-23 DIAGNOSIS — H53.149 PHOTOPHOBIA: ICD-10-CM

## 2019-01-23 DIAGNOSIS — F41.9 ANXIETY: ICD-10-CM

## 2019-01-23 DIAGNOSIS — G43.909 MIGRAINE WITHOUT STATUS MIGRAINOSUS, NOT INTRACTABLE, UNSPECIFIED MIGRAINE TYPE: Primary | ICD-10-CM

## 2019-01-23 DIAGNOSIS — H53.9 VISUAL DISTURBANCE: ICD-10-CM

## 2019-01-23 PROCEDURE — 99204 OFFICE O/P NEW MOD 45 MIN: CPT | Performed by: PSYCHIATRY & NEUROLOGY

## 2019-01-23 RX ORDER — RIZATRIPTAN BENZOATE 10 MG/1
10 TABLET ORAL
Qty: 9 TABLET | Refills: 5 | Status: SHIPPED | OUTPATIENT
Start: 2019-01-23 | End: 2019-11-20 | Stop reason: SDUPTHER

## 2019-01-23 RX ORDER — SUMATRIPTAN AND NAPROXEN SODIUM 85; 500 MG/1; MG/1
1 TABLET, FILM COATED ORAL ONCE
Qty: 9 TABLET | Refills: 5 | Status: SHIPPED | OUTPATIENT
Start: 2019-01-23 | End: 2019-05-20

## 2019-01-23 RX ORDER — FROVATRIPTAN SUCCINATE 2.5 MG/1
2.5 TABLET, FILM COATED ORAL PRN
Qty: 9 TABLET | Refills: 5 | Status: SHIPPED | OUTPATIENT
Start: 2019-01-23 | End: 2019-05-20

## 2019-01-23 RX ORDER — ZOLMITRIPTAN 5 MG/1
5 TABLET, FILM COATED ORAL
Qty: 9 TABLET | Refills: 5 | Status: SHIPPED | OUTPATIENT
Start: 2019-01-23 | End: 2019-05-20

## 2019-01-23 RX ORDER — OXCARBAZEPINE 150 MG/1
TABLET, FILM COATED ORAL
Qty: 120 TABLET | Refills: 5 | Status: SHIPPED | OUTPATIENT
Start: 2019-01-23 | End: 2019-03-05 | Stop reason: CLARIF

## 2019-01-31 RX ORDER — ESCITALOPRAM OXALATE 10 MG/1
10 TABLET ORAL DAILY
Qty: 90 TABLET | Refills: 0 | Status: SHIPPED | OUTPATIENT
Start: 2019-01-31 | End: 2019-05-01 | Stop reason: SDUPTHER

## 2019-02-06 ENCOUNTER — HOSPITAL ENCOUNTER (OUTPATIENT)
Dept: MRI IMAGING | Age: 34
Discharge: HOME | End: 2019-02-06
Payer: COMMERCIAL

## 2019-02-06 DIAGNOSIS — H53.9 VISUAL DISTURBANCE: ICD-10-CM

## 2019-02-06 DIAGNOSIS — G43.909 MIGRAINE WITHOUT STATUS MIGRAINOSUS, NOT INTRACTABLE, UNSPECIFIED MIGRAINE TYPE: ICD-10-CM

## 2019-02-06 DIAGNOSIS — H53.149 PHOTOPHOBIA: ICD-10-CM

## 2019-02-06 PROCEDURE — 70551 MRI BRAIN STEM W/O DYE: CPT

## 2019-02-12 ENCOUNTER — TELEPHONE (OUTPATIENT)
Dept: INTERNAL MEDICINE | Age: 34
End: 2019-02-12

## 2019-02-12 ENCOUNTER — TELEPHONE (OUTPATIENT)
Dept: NEUROLOGY | Age: 34
End: 2019-02-12

## 2019-02-14 RX ORDER — OXCARBAZEPINE 150 MG/1
TABLET, FILM COATED ORAL
Qty: 240 TABLET | Refills: 5 | Status: SHIPPED | OUTPATIENT
Start: 2019-02-14 | End: 2019-03-05

## 2019-02-20 ENCOUNTER — OFFICE VISIT (OUTPATIENT)
Dept: NEUROLOGY | Age: 34
End: 2019-02-20
Payer: COMMERCIAL

## 2019-02-20 VITALS
OXYGEN SATURATION: 99 % | DIASTOLIC BLOOD PRESSURE: 74 MMHG | WEIGHT: 159 LBS | BODY MASS INDEX: 28.17 KG/M2 | HEART RATE: 70 BPM | SYSTOLIC BLOOD PRESSURE: 113 MMHG | HEIGHT: 63 IN

## 2019-02-20 DIAGNOSIS — H53.9 VISUAL DISTURBANCE: ICD-10-CM

## 2019-02-20 DIAGNOSIS — F41.9 ANXIETY: ICD-10-CM

## 2019-02-20 DIAGNOSIS — H53.149 PHOTOPHOBIA: ICD-10-CM

## 2019-02-20 DIAGNOSIS — G43.909 MIGRAINE WITHOUT STATUS MIGRAINOSUS, NOT INTRACTABLE, UNSPECIFIED MIGRAINE TYPE: Primary | ICD-10-CM

## 2019-02-20 PROCEDURE — 99214 OFFICE O/P EST MOD 30 MIN: CPT | Performed by: PSYCHIATRY & NEUROLOGY

## 2019-03-05 ENCOUNTER — OFFICE VISIT (OUTPATIENT)
Dept: INTERNAL MEDICINE | Age: 34
End: 2019-03-05
Payer: COMMERCIAL

## 2019-03-05 VITALS
WEIGHT: 157 LBS | HEART RATE: 87 BPM | OXYGEN SATURATION: 99 % | DIASTOLIC BLOOD PRESSURE: 70 MMHG | RESPIRATION RATE: 18 BRPM | BODY MASS INDEX: 27.82 KG/M2 | SYSTOLIC BLOOD PRESSURE: 108 MMHG | HEIGHT: 63 IN

## 2019-03-05 DIAGNOSIS — G47.9 SLEEP DISTURBANCE: ICD-10-CM

## 2019-03-05 DIAGNOSIS — G43.909 MIGRAINE SYNDROME: ICD-10-CM

## 2019-03-05 DIAGNOSIS — R53.83 OTHER FATIGUE: ICD-10-CM

## 2019-03-05 DIAGNOSIS — F41.1 GENERALIZED ANXIETY DISORDER: Primary | ICD-10-CM

## 2019-03-05 PROCEDURE — 99213 OFFICE O/P EST LOW 20 MIN: CPT | Performed by: INTERNAL MEDICINE

## 2019-03-05 ASSESSMENT — PATIENT HEALTH QUESTIONNAIRE - PHQ9
2. FEELING DOWN, DEPRESSED OR HOPELESS: 0
SUM OF ALL RESPONSES TO PHQ QUESTIONS 1-9: 0
SUM OF ALL RESPONSES TO PHQ QUESTIONS 1-9: 0
1. LITTLE INTEREST OR PLEASURE IN DOING THINGS: 0
SUM OF ALL RESPONSES TO PHQ9 QUESTIONS 1 & 2: 0

## 2019-03-05 ASSESSMENT — ENCOUNTER SYMPTOMS
COUGH: 0
SORE THROAT: 0
WHEEZING: 0
CONSTIPATION: 0
ABDOMINAL PAIN: 0
CHEST TIGHTNESS: 0

## 2019-05-01 RX ORDER — ESCITALOPRAM OXALATE 10 MG/1
TABLET ORAL
Qty: 90 TABLET | Refills: 0 | Status: SHIPPED | OUTPATIENT
Start: 2019-05-01 | End: 2019-05-23 | Stop reason: SDUPTHER

## 2019-05-01 NOTE — TELEPHONE ENCOUNTER
Francisca Hinson called requesting a refill of the below medication which has been pended for you:     Requested Prescriptions     Pending Prescriptions Disp Refills    escitalopram (LEXAPRO) 10 MG tablet [Pharmacy Med Name: ESCITALOPRAM 10 MG TABLET] 90 tablet 0     Sig: TAKE 1 TABLET BY MOUTH EVERY DAY       Last Appointment Date: 3/5/2019  Next Appointment Date: 5/23/2019    No Known Allergies

## 2019-05-20 ENCOUNTER — OFFICE VISIT (OUTPATIENT)
Dept: NEUROLOGY | Age: 34
End: 2019-05-20
Payer: COMMERCIAL

## 2019-05-20 VITALS
HEART RATE: 80 BPM | DIASTOLIC BLOOD PRESSURE: 71 MMHG | BODY MASS INDEX: 28.04 KG/M2 | WEIGHT: 158.25 LBS | HEIGHT: 63 IN | SYSTOLIC BLOOD PRESSURE: 111 MMHG

## 2019-05-20 DIAGNOSIS — E55.9 VITAMIN D DEFICIENCY: ICD-10-CM

## 2019-05-20 DIAGNOSIS — F41.9 ANXIETY: ICD-10-CM

## 2019-05-20 DIAGNOSIS — Z00.00 ANNUAL PHYSICAL EXAM: ICD-10-CM

## 2019-05-20 DIAGNOSIS — H53.149 PHOTOPHOBIA: ICD-10-CM

## 2019-05-20 DIAGNOSIS — H53.9 VISUAL DISTURBANCE: ICD-10-CM

## 2019-05-20 DIAGNOSIS — G43.909 MIGRAINE WITHOUT STATUS MIGRAINOSUS, NOT INTRACTABLE, UNSPECIFIED MIGRAINE TYPE: Primary | ICD-10-CM

## 2019-05-20 LAB
ALBUMIN SERPL-MCNC: 4.2 G/DL (ref 3.5–5.2)
ALP BLD-CCNC: 82 U/L (ref 35–104)
ALT SERPL-CCNC: 13 U/L (ref 5–33)
ANION GAP SERPL CALCULATED.3IONS-SCNC: 10 MMOL/L (ref 7–19)
AST SERPL-CCNC: 14 U/L (ref 5–32)
BASOPHILS ABSOLUTE: 0 K/UL (ref 0–0.2)
BASOPHILS RELATIVE PERCENT: 0.5 % (ref 0–1)
BILIRUB SERPL-MCNC: 0.5 MG/DL (ref 0.2–1.2)
BILIRUBIN URINE: NEGATIVE
BLOOD, URINE: NEGATIVE
BUN BLDV-MCNC: 15 MG/DL (ref 6–20)
CALCIUM SERPL-MCNC: 8.7 MG/DL (ref 8.6–10)
CHLORIDE BLD-SCNC: 106 MMOL/L (ref 98–111)
CHOLESTEROL, TOTAL: 148 MG/DL (ref 160–199)
CLARITY: CLEAR
CO2: 24 MMOL/L (ref 22–29)
COLOR: YELLOW
CREAT SERPL-MCNC: 0.6 MG/DL (ref 0.5–0.9)
EOSINOPHILS ABSOLUTE: 0.1 K/UL (ref 0–0.6)
EOSINOPHILS RELATIVE PERCENT: 2.2 % (ref 0–5)
GFR NON-AFRICAN AMERICAN: >60
GLUCOSE BLD-MCNC: 91 MG/DL (ref 74–109)
GLUCOSE URINE: NEGATIVE MG/DL
HCT VFR BLD CALC: 39 % (ref 37–47)
HDLC SERPL-MCNC: 41 MG/DL (ref 65–121)
HEMOGLOBIN: 13.2 G/DL (ref 12–16)
KETONES, URINE: NEGATIVE MG/DL
LDL CHOLESTEROL CALCULATED: 95 MG/DL
LEUKOCYTE ESTERASE, URINE: NEGATIVE
LYMPHOCYTES ABSOLUTE: 2.3 K/UL (ref 1.1–4.5)
LYMPHOCYTES RELATIVE PERCENT: 35.2 % (ref 20–40)
MCH RBC QN AUTO: 30.8 PG (ref 27–31)
MCHC RBC AUTO-ENTMCNC: 33.8 G/DL (ref 33–37)
MCV RBC AUTO: 90.9 FL (ref 81–99)
MONOCYTES ABSOLUTE: 0.4 K/UL (ref 0–0.9)
MONOCYTES RELATIVE PERCENT: 6.8 % (ref 0–10)
NEUTROPHILS ABSOLUTE: 3.6 K/UL (ref 1.5–7.5)
NEUTROPHILS RELATIVE PERCENT: 55 % (ref 50–65)
NITRITE, URINE: NEGATIVE
PDW BLD-RTO: 11.9 % (ref 11.5–14.5)
PH UA: 7 (ref 5–8)
PLATELET # BLD: 226 K/UL (ref 130–400)
PMV BLD AUTO: 10.1 FL (ref 9.4–12.3)
POTASSIUM SERPL-SCNC: 4.2 MMOL/L (ref 3.5–5)
PROTEIN UA: NEGATIVE MG/DL
RBC # BLD: 4.29 M/UL (ref 4.2–5.4)
SODIUM BLD-SCNC: 140 MMOL/L (ref 136–145)
SPECIFIC GRAVITY UA: 1.02 (ref 1–1.03)
TOTAL PROTEIN: 6.9 G/DL (ref 6.6–8.7)
TRIGL SERPL-MCNC: 61 MG/DL (ref 0–149)
TSH SERPL DL<=0.05 MIU/L-ACNC: 1.88 UIU/ML (ref 0.27–4.2)
UROBILINOGEN, URINE: 0.2 E.U./DL
VITAMIN D 25-HYDROXY: 26.7 NG/ML
WBC # BLD: 6.5 K/UL (ref 4.8–10.8)

## 2019-05-20 PROCEDURE — 99214 OFFICE O/P EST MOD 30 MIN: CPT | Performed by: PSYCHIATRY & NEUROLOGY

## 2019-05-20 NOTE — PROGRESS NOTES
Chief Complaint   Patient presents with    Migraine     3 month follow up        Laurel Delaney is a 34y.o. year old female who is seen for evaluation of migraines. The patient indicates that she has had migraines since her early teens related to her menstrual cycle. She indicates that over time they have worsened. They have gone from one a month to 15-20 migraine days a month. Her headache can occur at any time of day. Anxiety may trigger them. She was taken off Wellbutrin and Pristiq as these caused headache. Imitrex used to work but no longer does. The headaches are associated with photophobia, phonophobia and occasional nausea. She was placed on Topamax up to 100 mg at night without benefit with some adverse effects. Her headache is always over the left temple. There is not neck pain she does get some visual disturbance before the headache. treximet helps the most but still takes 6-8 hours. Ajovy helped initially but less and less effective after 3 injections. At least 15 days of migraine last month.     Active Ambulatory Problems     Diagnosis Date Noted    Vitamin D deficiency 2018    Migraine without status migrainosus, not intractable 2018    Generalized anxiety disorder 2018    Sleep disturbance 2018    Other fatigue 2018    Photophobia 2019    Visual disturbance 2019    Anxiety 2019    Migraine syndrome 2019     Resolved Ambulatory Problems     Diagnosis Date Noted    Annual physical exam 2018     Past Medical History:   Diagnosis Date    Anxiety     Depression     Migraines        Past Surgical History:   Procedure Laterality Date     SECTION         Family History   Problem Relation Age of Onset    Diabetes Father     Heart Disease Father     Diabetes Sister        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   Social Needs    Financial resource strain: Not on file    Food insecurity:     Worry: Not on file     Inability: Not on file    Transportation needs:     Medical: Not on file     Non-medical: Not on file   Tobacco Use    Smoking status: Never Smoker    Smokeless tobacco: Never Used   Substance and Sexual Activity    Alcohol use: Yes     Comment: Rarely    Drug use: No    Sexual activity: Yes     Partners: Male   Lifestyle    Physical activity:     Days per week: Not on file     Minutes per session: Not on file    Stress: Not on file   Relationships    Social connections:     Talks on phone: Not on file     Gets together: Not on file     Attends [employer] service: Not on file     Active member of club or organization: Not on file     Attends meetings of clubs or organizations: Not on file     Relationship status: Not on file    Intimate partner violence:     Fear of current or ex partner: Not on file     Emotionally abused: Not on file     Physically abused: Not on file     Forced sexual activity: Not on file   Other Topics Concern    Not on file   Social History Narrative    Not on file       Review of Systems     Constitutional - No fever or chills. No diaphoresis or significant fatigue. HENT -  No tinnitus or significant hearing loss. Eyes - no sudden vision change or eye pain  Respiratory - no significant shortness of breath or cough  Cardiovascular - no chest pain No palpitations or significant leg swelling  Gastrointestinal - no abdominal swelling or pain. Genitourinary - No difficulty urinating, dysuria  Musculoskeletal - no back pain or myalgia. Skin - no color change or rash  Neurologic - No seizures. No lateralizing weakness. Hematologic - no easy bruising or excessive bleeding. Psychiatric - no severe anxiety or nervousness.    All other review of systems found for: Dari Ha  Lab Results   Component Value Date    WBC 6.5 05/20/2019    HGB 13.2 05/20/2019    HCT 39.0 05/20/2019    MCV 90.9 05/20/2019     05/20/2019     No results found for: NA, K, CL, CO2, BUN, CREATININE, GLUCOSE, CALCIUM, PROT, LABALBU, BILITOT, ALKPHOS, AST, ALT, LABGLOM, GFRAA, AGRATIO, GLOB       Impression   1. No abnormality is seen. Signed by Dr Ewa Henderson on 2/6/2019 8:17 PM         Assessment    ICD-10-CM    1. Migraine without status migrainosus, not intractable, unspecified migraine type G43.909    2. Photophobia H53.149    3. Visual disturbance H53.9    4. Anxiety F41.9        Her neurological examination today was unremarkable. Based upon her history and examination her headaches appear to be consistent with migraines. At this time she will be weaned off trileptal. She was given several several triptans to see which one would be most effective. maxalt and imitex work together only. Her MRI of the brain for completeness was unremarkable. She will be tried on Ajovy one more time. If ineffective could try another medicine or emgality or botox. The patient indicated understanding of the management plan. She is to follow up with me in three months and call with any issues. Plan    No orders of the defined types were placed in this encounter. No orders of the defined types were placed in this encounter. Return in about 3 months (around 8/20/2019). EMR Dragon/transcription disclaimer:Significant part of this  encounter note is electronic transcription/translation of spoken language to printed text. The electronic translation of spoken language may be erroneous, or at times, nonsensical words or phrases may be inadvertently transcribed.  Although I have reviewed the note for such errors, some may still exist.

## 2019-05-23 ENCOUNTER — OFFICE VISIT (OUTPATIENT)
Dept: INTERNAL MEDICINE | Age: 34
End: 2019-05-23
Payer: COMMERCIAL

## 2019-05-23 VITALS
OXYGEN SATURATION: 96 % | HEART RATE: 64 BPM | WEIGHT: 155 LBS | SYSTOLIC BLOOD PRESSURE: 110 MMHG | DIASTOLIC BLOOD PRESSURE: 70 MMHG | HEIGHT: 63 IN | BODY MASS INDEX: 27.46 KG/M2

## 2019-05-23 DIAGNOSIS — F41.1 GENERALIZED ANXIETY DISORDER: ICD-10-CM

## 2019-05-23 DIAGNOSIS — G43.909 MIGRAINE SYNDROME: ICD-10-CM

## 2019-05-23 DIAGNOSIS — E55.9 VITAMIN D DEFICIENCY: ICD-10-CM

## 2019-05-23 DIAGNOSIS — Z00.00 ANNUAL PHYSICAL EXAM: Primary | ICD-10-CM

## 2019-05-23 PROCEDURE — 99395 PREV VISIT EST AGE 18-39: CPT | Performed by: INTERNAL MEDICINE

## 2019-05-23 RX ORDER — ESCITALOPRAM OXALATE 10 MG/1
TABLET ORAL
Qty: 90 TABLET | Refills: 3 | Status: SHIPPED | OUTPATIENT
Start: 2019-05-23 | End: 2020-06-25 | Stop reason: SDUPTHER

## 2019-05-23 ASSESSMENT — ENCOUNTER SYMPTOMS
COLOR CHANGE: 0
NAUSEA: 0
SORE THROAT: 0
TROUBLE SWALLOWING: 0
ABDOMINAL PAIN: 0
EYE REDNESS: 0
EYE PAIN: 0
CHEST TIGHTNESS: 0
SINUS PRESSURE: 0
BLOOD IN STOOL: 0
WHEEZING: 0
CONSTIPATION: 0
COUGH: 0
VOMITING: 0
VOICE CHANGE: 0
DIARRHEA: 0

## 2019-05-23 NOTE — PROGRESS NOTES
Chief Complaint:   Mica Hinson is a 34 y.o. female who presents forcomplete physical exam.    History of Present Illness:      Mica Hinson is a 34 y.o. female who presents todayfor wellness visit AND follow up on her chronic medical conditions as noted below. Generalized anxiety disorder- lexapro seems to be doing well/ feeling better    Vitamin D deficiency- has been missing her medication    Migraine syndrome  Still issues  Now follows dr Jovon Roberson    Patient Active Problem List    Diagnosis Date Noted    Migraine syndrome 2019    Photophobia 2019    Visual disturbance 2019    Anxiety 2019    Vitamin D deficiency 2018    Migraine without status migrainosus, not intractable 2018    Generalized anxiety disorder 2018    Sleep disturbance 2018    Other fatigue 2018       Past Medical History:   Diagnosis Date    Anxiety     Depression     Migraines        Past Surgical History:   Procedure Laterality Date     SECTION         Current Outpatient Medications   Medication Sig Dispense Refill    escitalopram (LEXAPRO) 10 MG tablet TAKE 1 TABLET BY MOUTH EVERY DAY 90 tablet 3    Fremanezumab-vfrm 225 MG/1.5ML SOSY Inject 225 mg into the skin every 30 days 1 Syringe 11    rizatriptan (MAXALT) 10 MG tablet Take 1 tablet by mouth once as needed for Migraine (max 2 in 24 hrs) May repeat in 2 hours if needed 9 tablet 5    SUMAtriptan (IMITREX) 100 MG tablet Take 1 tablet by mouth as needed (Take one tablet by mouth daily as needed) 30 tablet 6    Cholecalciferol (VITAMIN D) 2000 units CAPS capsule Take 1 capsule by mouth daily       No current facility-administered medications for this visit.       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   Social Needs    Financial resource strain: Not on file    Food insecurity:     Worry: Not on file     Inability: Not on file    Transportation needs:     Medical: Not on file     Non-medical: Not on file   Tobacco Use    Smoking status: Never Smoker    Smokeless tobacco: Never Used   Substance and Sexual Activity    Alcohol use: Yes     Comment: Rarely    Drug use: No    Sexual activity: Yes     Partners: Male   Lifestyle    Physical activity:     Days per week: Not on file     Minutes per session: Not on file    Stress: Not on file   Relationships    Social connections:     Talks on phone: Not on file     Gets together: Not on file     Attends [employer] service: Not on file     Active member of club or organization: Not on file     Attends meetings of clubs or organizations: Not on file     Relationship status: Not on file    Intimate partner violence:     Fear of current or ex partner: Not on file     Emotionally abused: Not on file     Physically abused: Not on file     Forced sexual activity: Not on file   Other Topics Concern    Not on file   Social History Narrative    Not on file     Family History   Problem Relation Age of Onset    Diabetes Father     Heart Disease Father     Diabetes Sister           Past Surgical History:   Procedure Laterality Date     SECTION           Lab Review   Orders Only on 2019   Component Date Value    Vit D, 25-Hydroxy 2019 26.7*    Color, UA 2019 YELLOW     Clarity, UA 2019 Clear     Glucose, Ur 2019 Negative     Bilirubin Urine 2019 Negative     Ketones, Urine 2019 Negative     Specific Gravity, UA 2019 1.018     Blood, Urine 2019 Negative     pH, UA 2019 7.0     Protein, UA 2019 Negative     Urobilinogen, Urine 2019 0.2     Nitrite, Urine 2019 Negative     Leukocyte Esterase, Urine 2019 Negative     TSH 2019 1.880     WBC 05/20/2019 6.5     RBC 05/20/2019 4.29     Hemoglobin 05/20/2019 13.2     Hematocrit 05/20/2019 39.0     MCV 05/20/2019 90.9     MCH 05/20/2019 30.8     MCHC 05/20/2019 33.8     RDW 05/20/2019 11.9     Platelets 59/39/6866 226     MPV 05/20/2019 10.1     Neutrophils % 05/20/2019 55.0     Lymphocytes % 05/20/2019 35.2     Monocytes % 05/20/2019 6.8     Eosinophils % 05/20/2019 2.2     Basophils % 05/20/2019 0.5     Neutrophils # 05/20/2019 3.6     Lymphocytes # 05/20/2019 2.3     Monocytes # 05/20/2019 0.40     Eosinophils # 05/20/2019 0.10     Basophils # 05/20/2019 0.00     Sodium 05/20/2019 140     Potassium 05/20/2019 4.2     Chloride 05/20/2019 106     CO2 05/20/2019 24     Anion Gap 05/20/2019 10     Glucose 05/20/2019 91     BUN 05/20/2019 15     CREATININE 05/20/2019 0.6     GFR Non- 05/20/2019 >60     Calcium 05/20/2019 8.7     Total Protein 05/20/2019 6.9     Alb 05/20/2019 4.2     Total Bilirubin 05/20/2019 0.5     Alkaline Phosphatase 05/20/2019 82     ALT 05/20/2019 13     AST 05/20/2019 14     Cholesterol, Total 05/20/2019 148*    Triglycerides 05/20/2019 61     HDL 05/20/2019 41*    LDL Calculated 05/20/2019 95          Review of Systems   Constitutional: Negative for chills, fatigue and fever. HENT: Negative for congestion, ear pain, postnasal drip, sinus pressure, sore throat, trouble swallowing and voice change. Eyes: Negative for pain, redness and visual disturbance. Respiratory: Negative for cough, chest tightness and wheezing. Cardiovascular: Negative for chest pain, palpitations and leg swelling. Gastrointestinal: Negative for abdominal pain, blood in stool, constipation, diarrhea, nausea and vomiting. Endocrine: Negative for polydipsia and polyuria. Genitourinary: Negative for dysuria, enuresis, flank pain, frequency and urgency. Musculoskeletal: Negative for arthralgias, gait problem and joint swelling. Skin: Negative for color change and rash. Neurological: Positive for headaches. Negative for dizziness, tremors, syncope, facial asymmetry, speech difficulty, weakness and numbness. Psychiatric/Behavioral: Negative for agitation, behavioral problems, confusion, sleep disturbance and suicidal ideas. The patient is not nervous/anxious. Vitals:    05/23/19 1358   BP: 110/70   Pulse: 64   SpO2: 96%   Weight: 155 lb (70.3 kg)   Height: 5' 3\" (1.6 m)      Wt Readings from Last 3 Encounters:   05/23/19 155 lb (70.3 kg)   05/20/19 158 lb 4 oz (71.8 kg)   03/05/19 157 lb (71.2 kg)   Body mass index is 27.46 kg/m². BP Readings from Last 3 Encounters:   05/23/19 110/70   05/20/19 111/71   03/05/19 108/70       Physical Exam   Constitutional: She is oriented to person, place, and time. She appears well-developed and well-nourished. HENT:   Head: Normocephalic and atraumatic. Right Ear: External ear normal.   Left Ear: External ear normal.   Mouth/Throat: Oropharynx is clear and moist.   Eyes: Pupils are equal, round, and reactive to light. Conjunctivae are normal. No scleral icterus. Neck: Normal range of motion. Neck supple. No JVD present. No thyromegaly present. Cardiovascular: Normal rate, regular rhythm and normal heart sounds. No murmur heard. Pulmonary/Chest: Effort normal and breath sounds normal. No respiratory distress. She has no wheezes. She exhibits no tenderness. Abdominal: Soft. Bowel sounds are normal. She exhibits no mass. There is no tenderness. Musculoskeletal: Normal range of motion. She exhibits no edema or tenderness. Lymphadenopathy:     She has no cervical adenopathy. Neurological: She is alert and oriented to person, place, and time. She has normal reflexes. No cranial nerve deficit. Coordination normal.   Skin: Skin is warm and dry. No rash noted. No erythema. Psychiatric: She has a normal mood and affect.  Her behavior is normal.         ASSESSMENT/PLAN      Annual physical exam  * pap pelvic per GYN  * fastin glab all good ( FBS + lipid    Generalized anxiety disorder  Sx better on lexapro  Cont same 10 mg daily    Vitamin D deficiency  Level low 26  Has not been taking daily  Start taking daily with lexapro    Migraine syndrome  Migraine headaches- now follows with dr Andree Montaño  Difficult to control  Per dr Andree Montaño:  (following highlighted text has been copied from this specialist note)  At this time she will be weaned off trileptal. She was given several several triptans to see which one would be most effective. maxalt and imitex work together only. Her MRI of the brain for completeness was unremarkable. She will be tried on Ajovy one more time. If ineffective could try another medicine or emgality or botox. The patient indicated understanding of the management plan. She is to follow up with me in three months     Orders Placed This Encounter   Procedures    CBC Auto Differential    Comprehensive Metabolic Panel    Lipid Panel    Vitamin D 25 Hydroxy    TSH without Reflex     New Prescriptions    No medications on file      There are no Patient Instructions on file for this visit. Return in about 6 months (around 11/23/2019) for Annual Physical.   EMR Dragon/transcription disclaimer:Significant part of this  encounter note is electronic transcription/translation of spoken language to printed text. The electronic translation of spoken language may beerroneous, or at times, nonsensical words or phrases may be inadvertently transcribed.  Although I have reviewed the note for such errors, some may still exist.

## 2019-06-03 ENCOUNTER — OFFICE VISIT (OUTPATIENT)
Dept: OBSTETRICS AND GYNECOLOGY | Facility: CLINIC | Age: 34
End: 2019-06-03

## 2019-06-03 VITALS
BODY MASS INDEX: 27.46 KG/M2 | DIASTOLIC BLOOD PRESSURE: 70 MMHG | WEIGHT: 155 LBS | HEIGHT: 63 IN | SYSTOLIC BLOOD PRESSURE: 118 MMHG

## 2019-06-03 DIAGNOSIS — Z01.419 WELL WOMAN EXAM WITH ROUTINE GYNECOLOGICAL EXAM: Primary | ICD-10-CM

## 2019-06-03 DIAGNOSIS — Z30.431 ENCOUNTER FOR ROUTINE CHECKING OF INTRAUTERINE CONTRACEPTIVE DEVICE (IUD): ICD-10-CM

## 2019-06-03 DIAGNOSIS — G43.809 OTHER MIGRAINE WITHOUT STATUS MIGRAINOSUS, NOT INTRACTABLE: ICD-10-CM

## 2019-06-03 PROCEDURE — 87624 HPV HI-RISK TYP POOLED RSLT: CPT | Performed by: NURSE PRACTITIONER

## 2019-06-03 PROCEDURE — 99395 PREV VISIT EST AGE 18-39: CPT | Performed by: NURSE PRACTITIONER

## 2019-06-03 PROCEDURE — G0123 SCREEN CERV/VAG THIN LAYER: HCPCS | Performed by: NURSE PRACTITIONER

## 2019-06-03 RX ORDER — MULTIVIT-MIN/IRON/FOLIC ACID/K 18-600-40
1 CAPSULE ORAL
COMMUNITY

## 2019-06-03 RX ORDER — SUMATRIPTAN 100 MG/1
TABLET, FILM COATED ORAL
COMMUNITY
End: 2019-06-03

## 2019-06-03 RX ORDER — RIZATRIPTAN BENZOATE 10 MG/1
TABLET ORAL
Refills: 5 | COMMUNITY
Start: 2019-04-30 | End: 2019-06-03

## 2019-06-03 RX ORDER — ESCITALOPRAM OXALATE 10 MG/1
TABLET ORAL
COMMUNITY
Start: 2019-05-23

## 2019-06-03 NOTE — PATIENT INSTRUCTIONS

## 2019-06-03 NOTE — PROGRESS NOTES
"Subjective     Yadira Wallace is a 33 y.o. female    Patient comes in today for yearly checkup.  Is having excessive migraines sometimes 2-3 times weekly.  She has seen neurology and they have tried her on many different medications.      Gynecologic Exam   The patient's pertinent negatives include no pelvic pain, vaginal bleeding or vaginal discharge. The patient is experiencing no pain. Pertinent negatives include no abdominal pain, anorexia, back pain, chills, constipation, diarrhea, discolored urine, dysuria, fever, flank pain, frequency, headaches, hematuria, joint pain, joint swelling, nausea, painful intercourse, rash, sore throat, urgency or vomiting. She is sexually active. She uses an IUD for contraception. Her menstrual history has been irregular.         /70   Ht 160 cm (63\")   Wt 70.3 kg (155 lb)   LMP 05/30/2019 (Approximate) Comment: Mirena  Breastfeeding? No   BMI 27.46 kg/m²     Outpatient Encounter Medications as of 6/3/2019   Medication Sig Dispense Refill   • Cholecalciferol (VITAMIN D) 2000 units capsule Take 1 capsule by mouth.     • escitalopram (LEXAPRO) 10 MG tablet TAKE 1 TABLET BY MOUTH EVERY DAY     • Fremanezumab-vfrm (AJOVY) 225 MG/1.5ML solution prefilled syringe Inject 225 mg under the skin into the appropriate area as directed.     • levonorgestrel (MIRENA, 52 MG,) 20 MCG/24HR IUD 1 each by Intrauterine route 1 (One) Time. 8/2016     • [DISCONTINUED] SUMAtriptan (IMITREX) 100 MG tablet Take 1 tablet by mouth 1 (One) Time As Needed for Migraine for up to 1 dose. 12 tablet 6   • [DISCONTINUED] ALPRAZolam (XANAX) 0.25 MG tablet Take 1 tablet by mouth 3 (Three) Times a Day As Needed for Anxiety. 90 tablet 0   • [DISCONTINUED] azelastine (ASTELIN) 0.1 % nasal spray      • [DISCONTINUED] buPROPion XL (WELLBUTRIN XL) 150 MG 24 hr tablet Take 1 tablet by mouth Daily. 30 tablet 12   • [DISCONTINUED] fluticasone (FLONASE) 50 MCG/ACT nasal spray      • [DISCONTINUED] rizatriptan " (MAXALT) 10 MG tablet TAKE 1 TABLET BY MOUTH ONCE AS NEEDED FOR MIGRAINE (MAX 2 IN 24 HRS) MAY REPEAT IN 2 HOURS IF NEEDED  5   • [DISCONTINUED] SUMAtriptan (IMITREX) 100 MG tablet Imitrex       No facility-administered encounter medications on file as of 6/3/2019.        Surgical History  Past Surgical History:   Procedure Laterality Date   •  SECTION     • MYRINGOTOMY W/ TUBES Bilateral     x2 (as a child)       Family History  Family History   Problem Relation Age of Onset   • Hypertension Father    • Diabetes Father         type 1   • Other Father         CABG   • Coronary artery disease Father    • Diabetes Sister         Type 1, In stable health   • No Known Problems Mother    • No Known Problems Brother    • Breast cancer Neg Hx    • Ovarian cancer Neg Hx    • Uterine cancer Neg Hx    • Colon cancer Neg Hx    • Melanoma Neg Hx    • Prostate cancer Neg Hx        The following portions of the patient's history were reviewed and updated as appropriate: allergies, current medications, past family history, past medical history, past social history, past surgical history and problem list.    Review of Systems   Constitutional: Negative for activity change, appetite change, chills, diaphoresis, fatigue, fever, unexpected weight gain and unexpected weight loss.   HENT: Negative for congestion, dental problem, drooling, ear discharge, ear pain, facial swelling, hearing loss, mouth sores, nosebleeds, postnasal drip, rhinorrhea, sinus pressure, sneezing, sore throat, tinnitus, trouble swallowing and voice change.    Eyes: Negative for blurred vision, double vision, photophobia, pain, discharge, redness, itching and visual disturbance.   Respiratory: Negative for apnea, cough, choking, chest tightness, shortness of breath, wheezing and stridor.    Cardiovascular: Negative for chest pain, palpitations and leg swelling.   Gastrointestinal: Negative for abdominal distention, abdominal pain, anal bleeding, anorexia,  blood in stool, constipation, diarrhea, nausea, rectal pain, vomiting, GERD and indigestion.   Endocrine: Negative for cold intolerance, heat intolerance, polydipsia, polyphagia and polyuria.   Genitourinary: Negative for breast discharge, urinary incontinence, decreased libido, decreased urine volume, difficulty urinating, dyspareunia, dysuria, flank pain, frequency, genital sores, hematuria, menstrual problem, pelvic pain, urgency, vaginal bleeding, vaginal discharge, vaginal pain, breast lump and breast pain.   Musculoskeletal: Negative for arthralgias, back pain, gait problem, joint pain, joint swelling, myalgias, neck pain, neck stiffness and bursitis.   Skin: Negative for color change, dry skin, pallor, rash, skin lesions and bruise.   Allergic/Immunologic: Negative for environmental allergies, food allergies and immunocompromised state.   Neurological: Positive for headache. Negative for dizziness, tremors, seizures, syncope, facial asymmetry, speech difficulty, weakness, light-headedness, numbness, memory problem and confusion.   Hematological: Negative for adenopathy. Does not bruise/bleed easily.   Psychiatric/Behavioral: Negative for agitation, behavioral problems, decreased concentration, dysphoric mood, hallucinations, self-injury, sleep disturbance, suicidal ideas, negative for hyperactivity, depressed mood and stress. The patient is not nervous/anxious.        Objective   Physical Exam   Constitutional: She is oriented to person, place, and time. She appears well-developed and well-nourished. No distress.   HENT:   Head: Normocephalic.   Right Ear: External ear normal.   Left Ear: External ear normal.   Nose: Nose normal.   Mouth/Throat: Oropharynx is clear and moist.   Eyes: Conjunctivae are normal. Right eye exhibits no discharge. Left eye exhibits no discharge. No scleral icterus.   Neck: Normal range of motion. Neck supple. Carotid bruit is not present. No tracheal deviation present. No  thyromegaly present.   Cardiovascular: Normal rate, regular rhythm, normal heart sounds and intact distal pulses.   No murmur heard.  Pulmonary/Chest: Effort normal and breath sounds normal. No respiratory distress. She has no wheezes. Right breast exhibits no inverted nipple, no mass, no nipple discharge, no skin change and no tenderness. Left breast exhibits no inverted nipple, no mass, no nipple discharge, no skin change and no tenderness. Breasts are symmetrical. There is no breast swelling.   Abdominal: Soft. She exhibits no distension and no mass. There is no tenderness. There is no guarding. No hernia. Hernia confirmed negative in the right inguinal area and confirmed negative in the left inguinal area.   Genitourinary: Rectum normal, vagina normal and uterus normal. Rectal exam shows no mass. No breast tenderness, discharge or bleeding. Pelvic exam was performed with patient supine. There is no rash, tenderness, lesion or injury on the right labia. There is no rash, tenderness, lesion or injury on the left labia. Uterus is not enlarged, not fixed and not tender. Cervix exhibits no motion tenderness, no discharge and no friability. Right adnexum displays no mass, no tenderness and no fullness. Left adnexum displays no mass, no tenderness and no fullness. No erythema, tenderness or bleeding in the vagina. No foreign body in the vagina. No signs of injury around the vagina. No vaginal discharge found.   Genitourinary Comments:   BSU normal  Urethral meatus  Normal  Perineum  Normal   Musculoskeletal: Normal range of motion. She exhibits no edema or tenderness.   Lymphadenopathy:        Head (right side): No submental, no submandibular, no tonsillar, no preauricular, no posterior auricular and no occipital adenopathy present.        Head (left side): No submental, no submandibular, no tonsillar, no preauricular, no posterior auricular and no occipital adenopathy present.     She has no cervical adenopathy.         Right cervical: No superficial cervical, no deep cervical and no posterior cervical adenopathy present.       Left cervical: No superficial cervical, no deep cervical and no posterior cervical adenopathy present.     She has no axillary adenopathy.        Right: No inguinal adenopathy present.        Left: No inguinal adenopathy present.   Neurological: She is alert and oriented to person, place, and time. Coordination normal.   Skin: Skin is warm and dry. No bruising and no rash noted. She is not diaphoretic. No erythema.   Psychiatric: She has a normal mood and affect. Her behavior is normal. Judgment and thought content normal.   Nursing note and vitals reviewed.      Assessment/Plan   Yadira was seen today for gynecologic exam.    Diagnoses and all orders for this visit:    Well woman exam with routine gynecological exam  Normal GYN exam. Will have lab work at PCP. Encouraged SBE, pt is aware how to do self breast exam and the importance of same. Discussed weight management and importance of maintaining a healthy weight. Discussed Vitamin D intake and the importance of adequate vitamin D for both Bone Health and a healthy immune system.  Discussed Daily exercise and the importance of same, in regards to a healthy heart as well as helping to maintain her weight and improving her mental health.  BMI 27.5. Pap smear is done per ASCCP guidelines.  -     Liquid-based Pap Smear, Screening    Encounter for routine checking of intrauterine contraceptive device (IUD)  Comments:  IUD strings could not be seen.  They were not seen last year as well and we did an ultrasound and it was present.  We did discuss the possibility of taking the IUD out to see if it helps her migraines however, she does not want to do that at this time.  She may call back to schedule that if nothing else works for her migraines.    Other migraine without status migrainosus, not intractable  Comments:  Patient's migraines have worsened.  She has  been seeing a neurologist they have tried many different medications with no improvement.  She is now on the Ajovy shot is scheduled to see him again next month.         Patient's Body mass index is 27.46 kg/m². BMI is above normal parameters. Recommendations include: educational material, exercise counseling and nutrition counseling.      Amrita William, SHAR  6/3/2019

## 2019-06-05 LAB
GEN CATEG CVX/VAG CYTO-IMP: NORMAL
HPV I/H RISK 4 DNA CVX QL PROBE+SIG AMP: NOT DETECTED
LAB AP CASE REPORT: NORMAL
LAB AP GYN ADDITIONAL INFORMATION: NORMAL
LAB AP GYN OTHER FINDINGS: NORMAL
PATH INTERP SPEC-IMP: NORMAL
STAT OF ADQ CVX/VAG CYTO-IMP: NORMAL

## 2019-08-20 ENCOUNTER — OFFICE VISIT (OUTPATIENT)
Dept: NEUROLOGY | Age: 34
End: 2019-08-20
Payer: COMMERCIAL

## 2019-08-20 VITALS
WEIGHT: 159 LBS | SYSTOLIC BLOOD PRESSURE: 118 MMHG | HEIGHT: 63 IN | BODY MASS INDEX: 28.17 KG/M2 | DIASTOLIC BLOOD PRESSURE: 82 MMHG | HEART RATE: 63 BPM

## 2019-08-20 DIAGNOSIS — H53.9 VISUAL DISTURBANCE: ICD-10-CM

## 2019-08-20 DIAGNOSIS — F41.9 ANXIETY: ICD-10-CM

## 2019-08-20 DIAGNOSIS — G43.909 MIGRAINE WITHOUT STATUS MIGRAINOSUS, NOT INTRACTABLE, UNSPECIFIED MIGRAINE TYPE: Primary | ICD-10-CM

## 2019-08-20 DIAGNOSIS — H53.149 PHOTOPHOBIA: ICD-10-CM

## 2019-08-20 PROCEDURE — 99214 OFFICE O/P EST MOD 30 MIN: CPT | Performed by: PSYCHIATRY & NEUROLOGY

## 2019-08-20 RX ORDER — PREDNISONE 10 MG/1
10 TABLET ORAL DAILY
Qty: 10 TABLET | Refills: 0 | Status: SHIPPED | OUTPATIENT
Start: 2019-08-20 | End: 2019-09-19

## 2019-08-20 NOTE — PROGRESS NOTES
Chief Complaint   Patient presents with    Migraine     3 month follow up        Juju Schreiber is a 34y.o. year old female who is seen for evaluation of migraines. The patient indicates that she has had migraines since her early teens related to her menstrual cycle. She indicates that over time they have worsened. They have gone from one a month to 15-20 migraine days a month. Her headache can occur at any time of day. Anxiety may trigger them. She was taken off Wellbutrin and Pristiq as these caused headache. Imitrex used to work but no longer does. The headaches are associated with photophobia, phonophobia and occasional nausea. She was placed on Topamax up to 100 mg at night without benefit with some adverse effects. Her headache is always over the left temple. There is not neck pain she does get some visual disturbance before the headache. treximet helps the most but still takes 6-8 hours. Ajovy cuts headache by half and less intense. injection site reaction last 2-3 days.     Active Ambulatory Problems     Diagnosis Date Noted    Vitamin D deficiency 2018    Migraine without status migrainosus, not intractable 2018    Generalized anxiety disorder 2018    Sleep disturbance 2018    Other fatigue 2018    Photophobia 2019    Visual disturbance 2019    Anxiety 2019    Migraine syndrome 2019     Resolved Ambulatory Problems     Diagnosis Date Noted    Annual physical exam 2018     Past Medical History:   Diagnosis Date    Depression     Migraines        Past Surgical History:   Procedure Laterality Date     SECTION         Family History   Problem Relation Age of Onset    Diabetes Father     Heart Disease Father     Diabetes Sister        No Known Allergies    Social History     Socioeconomic History    Marital status:      Spouse name: Not on file    Number of children: 2    Years of education: Not on file    Highest and speed  No ataxia    No results found for: [id number]  Lab Results   Component Value Date    WBC 6.5 05/20/2019    HGB 13.2 05/20/2019    HCT 39.0 05/20/2019    MCV 90.9 05/20/2019     05/20/2019     Lab Results   Component Value Date     05/20/2019    K 4.2 05/20/2019     05/20/2019    CO2 24 05/20/2019    BUN 15 05/20/2019    CREATININE 0.6 05/20/2019    GLUCOSE 91 05/20/2019    CALCIUM 8.7 05/20/2019    PROT 6.9 05/20/2019    LABALBU 4.2 05/20/2019    BILITOT 0.5 05/20/2019    ALKPHOS 82 05/20/2019    AST 14 05/20/2019    ALT 13 05/20/2019    LABGLOM >60 05/20/2019          Impression   1. No abnormality is seen. Signed by Dr Julieth Rosas on 2/6/2019 8:17 PM         Assessment    ICD-10-CM    1. Migraine without status migrainosus, not intractable, unspecified migraine type G43.909    2. Photophobia H53.149    3. Visual disturbance H53.9    4. Anxiety F41.9        Her neurological examination today was unremarkable. Based upon her history and examination her headaches appear to be consistent with migraines. At this time she will be weaned off trileptal. She was given several several triptans to see which one would be most effective. maxalt and imitex work together only. Her MRI of the brain for completeness was unremarkable. She will be continued on Ajovy and given prednisone 10 mg prior to each injections. If ineffective could try another medicine or emgality or botox. The patient indicated understanding of the management plan. She is to follow up with me in three months and call with any issues. Plan    No orders of the defined types were placed in this encounter. Orders Placed This Encounter   Medications    predniSONE (DELTASONE) 10 MG tablet     Sig: Take 1 tablet by mouth daily     Dispense:  10 tablet     Refill:  0       Return in about 3 months (around 11/20/2019).       EMR Dragon/transcription disclaimer:Significant part of this  encounter note is electronic

## 2019-11-20 ENCOUNTER — TELEPHONE (OUTPATIENT)
Dept: NEUROLOGY | Age: 34
End: 2019-11-20

## 2019-11-20 ENCOUNTER — OFFICE VISIT (OUTPATIENT)
Dept: NEUROLOGY | Age: 34
End: 2019-11-20
Payer: COMMERCIAL

## 2019-11-20 VITALS
HEART RATE: 77 BPM | SYSTOLIC BLOOD PRESSURE: 111 MMHG | WEIGHT: 158 LBS | BODY MASS INDEX: 28 KG/M2 | DIASTOLIC BLOOD PRESSURE: 68 MMHG | HEIGHT: 63 IN

## 2019-11-20 DIAGNOSIS — H53.9 VISUAL DISTURBANCE: ICD-10-CM

## 2019-11-20 DIAGNOSIS — H53.149 PHOTOPHOBIA: ICD-10-CM

## 2019-11-20 DIAGNOSIS — G43.909 MIGRAINE WITHOUT STATUS MIGRAINOSUS, NOT INTRACTABLE, UNSPECIFIED MIGRAINE TYPE: Primary | ICD-10-CM

## 2019-11-20 DIAGNOSIS — F41.9 ANXIETY: ICD-10-CM

## 2019-11-20 PROCEDURE — 99214 OFFICE O/P EST MOD 30 MIN: CPT | Performed by: PSYCHIATRY & NEUROLOGY

## 2019-11-20 RX ORDER — OXCARBAZEPINE 150 MG/1
TABLET, FILM COATED ORAL
Qty: 120 TABLET | Refills: 5 | Status: SHIPPED | OUTPATIENT
Start: 2019-11-20 | End: 2020-07-27 | Stop reason: CLARIF

## 2019-11-20 RX ORDER — RIZATRIPTAN BENZOATE 10 MG/1
10 TABLET ORAL
Qty: 9 TABLET | Refills: 11 | Status: SHIPPED | OUTPATIENT
Start: 2019-11-20 | End: 2020-07-27

## 2019-11-20 RX ORDER — SUMATRIPTAN 100 MG/1
100 TABLET, FILM COATED ORAL PRN
Qty: 30 TABLET | Refills: 11 | Status: SHIPPED | OUTPATIENT
Start: 2019-11-20 | End: 2020-12-04

## 2019-12-12 ENCOUNTER — TELEPHONE (OUTPATIENT)
Dept: NEUROLOGY | Age: 34
End: 2019-12-12

## 2020-01-02 ENCOUNTER — TELEPHONE (OUTPATIENT)
Dept: INTERNAL MEDICINE | Age: 35
End: 2020-01-02

## 2020-01-10 ENCOUNTER — TELEPHONE (OUTPATIENT)
Dept: NEUROSURGERY | Age: 35
End: 2020-01-10

## 2020-01-10 NOTE — TELEPHONE ENCOUNTER
Called patient about needing to reschedule her botox appointment , left a message to give us a call back ,

## 2020-03-09 ENCOUNTER — HOSPITAL ENCOUNTER (OUTPATIENT)
Dept: PAIN MANAGEMENT | Age: 35
Discharge: HOME | End: 2020-03-09
Payer: COMMERCIAL

## 2020-03-09 VITALS
OXYGEN SATURATION: 99 % | DIASTOLIC BLOOD PRESSURE: 68 MMHG | SYSTOLIC BLOOD PRESSURE: 118 MMHG | TEMPERATURE: 97.9 F | RESPIRATION RATE: 18 BRPM | HEART RATE: 80 BPM

## 2020-03-09 PROCEDURE — 64615 CHEMODENERV MUSC MIGRAINE: CPT

## 2020-03-09 PROCEDURE — 64615 CHEMODENERV MUSC MIGRAINE: CPT | Performed by: PSYCHIATRY & NEUROLOGY

## 2020-03-09 NOTE — PROGRESS NOTES
Procedure:  Level of Consciousness: [x]Alert [x]Oriented []Disoriented []Lethargic  Anxiety Level: [x]Calm []Anxious []Depressed []Other  Skin: [x]Warm [x]Dry []Cool []Moist []Intact []Other  Cardiovascular: []Palpitations: [x]Never []Occasionally []Frequently  Chest Pain: [x]No []Yes  Respiratory:  [x]Unlabored []Labored []Cough ([] Productive []Unproductive)  HCG Required: [x]No []Yes   Results: []Negative []Positive  Knowledge Level:        [x]Patient/Other verbalized understanding of pre-procedure instructions. []Assessment of post-op care needs (transportation, responsible caregiver)        []Able to discuss health care problems and how to deal with it.   Factors that Affect Teaching:        Language Barrier: []No []Yes - why:        Hearing Loss:        []No []Yes            Corrective Device:  []Yes []No        Vision Loss:           []No []Yes            Corrective Device:  []Yes []No        Memory Loss:       []No []Yes            []Short Term []Long Term  Motivational Level:  [x]Asks Questions                  []Extremely Anxious       []Seems Interested               []Seems Uninterested                  []Denies need for Education  Risk for Injury:  [x]Patient oriented to person, place and time  []History of frequent falls/loss of balance  Nutritional:  []Change in appetite   []Weight Gain   []Weight Loss  Functional:  []Requires assistance with ADL's

## 2020-06-01 ENCOUNTER — HOSPITAL ENCOUNTER (OUTPATIENT)
Dept: PAIN MANAGEMENT | Age: 35
Discharge: HOME | End: 2020-06-01
Payer: COMMERCIAL

## 2020-06-01 VITALS
DIASTOLIC BLOOD PRESSURE: 73 MMHG | TEMPERATURE: 97.7 F | BODY MASS INDEX: 24.75 KG/M2 | HEART RATE: 63 BPM | SYSTOLIC BLOOD PRESSURE: 115 MMHG | RESPIRATION RATE: 18 BRPM | OXYGEN SATURATION: 100 % | HEIGHT: 64 IN | WEIGHT: 145 LBS

## 2020-06-01 PROCEDURE — 64615 CHEMODENERV MUSC MIGRAINE: CPT | Performed by: PSYCHIATRY & NEUROLOGY

## 2020-06-01 PROCEDURE — 64615 CHEMODENERV MUSC MIGRAINE: CPT

## 2020-06-01 RX ORDER — METOPROLOL SUCCINATE 25 MG/1
25 TABLET, EXTENDED RELEASE ORAL DAILY
COMMUNITY

## 2020-06-01 RX ORDER — SUMATRIPTAN 6 MG/.5ML
6 INJECTION, SOLUTION SUBCUTANEOUS
COMMUNITY

## 2020-06-01 NOTE — PROCEDURES
[address]    Patient Name: Maria Elena Rollins    : 1985                    Age: 30 y.o. Sex: female    Date: 2020    Pre-op Diagnosis: Chronic Migraines    Post-op Diagnosis: Chronic Migraines    Procedure: Botox injections x 155 units (45 units wasted) for migraine    Performing Procedure:  Dr Ros Turk for the past 24 hrs:   BP Temp Temp src Pulse Resp SpO2 Height Weight   20 1151 115/73 97.7 °F (36.5 °C) Temporal 63 18 100 % 5' 4\" (1.626 m) 145 lb (65.8 kg)       Previous Injections:  Patient had 5 headaches/migraines over the past month. Indications:    Maria Elena Rollins has been treated for problems with chronic migraine headaches. The patient was taken through a conservative course of treatment without complete resolution of symptoms. Botox injection therapy was offered and after the risks and benefits of the procedure were explained to the patient, we had agreed to proceed. The patient was taken to the procedure room and placed in the seated position. The following muscles were identified using palpation and standard landmarks. These muscles were marked and prepped with alcohol. A total of 155 units were injected after careful aspiration and the following distribution bilaterally:  10 units in 2 sites, procerus 5 units in one site, frontalis 20 units in 4 sites, temporalis 40 unit in 8 sites, occipitals 30 units in 6 sites, cervical paraspinals 20 units in 4 sites, and trapezius 30 units in 6 sites. Discharge: The patient tolerated the procedure well. There were no complications during the procedure and the patient was discharged home with discharge instructions. The patient has been instructed to contact the office should there be any complications or questions to arise between today and their next appointment.     Plan:  [] Will return to the office in   [] 1 month  [] 4 - 6 weeks  [] 8 weeks   [x] 12

## 2020-06-11 RX ORDER — ESCITALOPRAM OXALATE 10 MG/1
TABLET ORAL
Qty: 90 TABLET | Refills: 3 | OUTPATIENT
Start: 2020-06-11

## 2020-06-11 NOTE — TELEPHONE ENCOUNTER
Gaylia Romberg called requesting a refill of the below medication which has been pended for you:     Requested Prescriptions     Pending Prescriptions Disp Refills    escitalopram (LEXAPRO) 10 MG tablet [Pharmacy Med Name: ESCITALOPRAM 10 MG TABLET] 90 tablet 3     Sig: TAKE 1 TABLET BY MOUTH EVERY DAY       Last Appointment Date: 5/23/2019  Next Appointment Date: Visit date not found    No Known Allergies

## 2020-06-25 RX ORDER — ESCITALOPRAM OXALATE 10 MG/1
TABLET ORAL
Qty: 90 TABLET | Refills: 3 | OUTPATIENT
Start: 2020-06-25

## 2020-06-25 RX ORDER — ESCITALOPRAM OXALATE 10 MG/1
TABLET ORAL
Qty: 90 TABLET | Refills: 0 | Status: SHIPPED | OUTPATIENT
Start: 2020-06-25 | End: 2020-07-27 | Stop reason: SDUPTHER

## 2020-06-29 ENCOUNTER — TELEPHONE (OUTPATIENT)
Dept: NEUROLOGY | Age: 35
End: 2020-06-29

## 2020-06-29 NOTE — TELEPHONE ENCOUNTER
Ofeliamer Libby (Luna: [id number])   Rx #: [id number]   Aimovig 140MG/ML auto-injectors   Form  FEP Electronic PA Form (NCPDP)   Created   23 hours ago   Sent to Plan   9 minutes ago   Plan Response   8 minutes ago   Submit Clinical Questions   1 minute ago   Determination   Wait for Determination  Please wait for [employer] to return a determination.

## 2020-07-14 ENCOUNTER — CLINICAL DOCUMENTATION (OUTPATIENT)
Dept: PRIMARY CARE CLINIC | Age: 35
End: 2020-07-14

## 2020-07-15 ENCOUNTER — OFFICE VISIT (OUTPATIENT)
Age: 35
End: 2020-07-15

## 2020-07-15 NOTE — PROGRESS NOTES
Patient had direct covid19 exposure. Recommend covid testing at drive thru testing site. Patient will go to [facility] site for testing. No symptoms.

## 2020-07-17 LAB
REPORT: NORMAL
SARS-COV-2: NOT DETECTED
THIS TEST SENT TO: NORMAL

## 2020-07-24 DIAGNOSIS — E55.9 VITAMIN D DEFICIENCY: ICD-10-CM

## 2020-07-24 DIAGNOSIS — F41.1 GENERALIZED ANXIETY DISORDER: ICD-10-CM

## 2020-07-24 DIAGNOSIS — G43.909 MIGRAINE SYNDROME: ICD-10-CM

## 2020-07-24 DIAGNOSIS — Z00.00 ANNUAL PHYSICAL EXAM: ICD-10-CM

## 2020-07-24 LAB
ALBUMIN SERPL-MCNC: 4.5 G/DL (ref 3.5–5.2)
ALP BLD-CCNC: 85 U/L (ref 35–104)
ALT SERPL-CCNC: 12 U/L (ref 5–33)
ANION GAP SERPL CALCULATED.3IONS-SCNC: 14 MMOL/L (ref 7–19)
AST SERPL-CCNC: 13 U/L (ref 5–32)
BASOPHILS ABSOLUTE: 0 K/UL (ref 0–0.2)
BASOPHILS RELATIVE PERCENT: 0.5 % (ref 0–1)
BILIRUB SERPL-MCNC: 0.4 MG/DL (ref 0.2–1.2)
BUN BLDV-MCNC: 13 MG/DL (ref 6–20)
CALCIUM SERPL-MCNC: 9.1 MG/DL (ref 8.6–10)
CHLORIDE BLD-SCNC: 103 MMOL/L (ref 98–111)
CHOLESTEROL, TOTAL: 175 MG/DL (ref 160–199)
CO2: 24 MMOL/L (ref 22–29)
CREAT SERPL-MCNC: 0.7 MG/DL (ref 0.5–0.9)
EOSINOPHILS ABSOLUTE: 0.2 K/UL (ref 0–0.6)
EOSINOPHILS RELATIVE PERCENT: 1.8 % (ref 0–5)
GFR AFRICAN AMERICAN: >59
GFR NON-AFRICAN AMERICAN: >60
GLUCOSE BLD-MCNC: 94 MG/DL (ref 74–109)
HCT VFR BLD CALC: 42.2 % (ref 37–47)
HDLC SERPL-MCNC: 45 MG/DL (ref 65–121)
HEMOGLOBIN: 14.1 G/DL (ref 12–16)
IMMATURE GRANULOCYTES #: 0 K/UL
LDL CHOLESTEROL CALCULATED: 109 MG/DL
LYMPHOCYTES ABSOLUTE: 2.5 K/UL (ref 1.1–4.5)
LYMPHOCYTES RELATIVE PERCENT: 27.6 % (ref 20–40)
MCH RBC QN AUTO: 30 PG (ref 27–31)
MCHC RBC AUTO-ENTMCNC: 33.4 G/DL (ref 33–37)
MCV RBC AUTO: 89.8 FL (ref 81–99)
MONOCYTES ABSOLUTE: 0.5 K/UL (ref 0–0.9)
MONOCYTES RELATIVE PERCENT: 5.9 % (ref 0–10)
NEUTROPHILS ABSOLUTE: 5.7 K/UL (ref 1.5–7.5)
NEUTROPHILS RELATIVE PERCENT: 63.7 % (ref 50–65)
PDW BLD-RTO: 11.9 % (ref 11.5–14.5)
PLATELET # BLD: 238 K/UL (ref 130–400)
PMV BLD AUTO: 10.2 FL (ref 9.4–12.3)
POTASSIUM SERPL-SCNC: 4.4 MMOL/L (ref 3.5–5)
RBC # BLD: 4.7 M/UL (ref 4.2–5.4)
SODIUM BLD-SCNC: 141 MMOL/L (ref 136–145)
TOTAL PROTEIN: 6.9 G/DL (ref 6.6–8.7)
TRIGL SERPL-MCNC: 105 MG/DL (ref 0–149)
TSH SERPL DL<=0.05 MIU/L-ACNC: 1.68 UIU/ML (ref 0.27–4.2)
VITAMIN D 25-HYDROXY: 37.5 NG/ML
WBC # BLD: 8.9 K/UL (ref 4.8–10.8)

## 2020-07-27 ENCOUNTER — OFFICE VISIT (OUTPATIENT)
Dept: INTERNAL MEDICINE | Age: 35
End: 2020-07-27
Payer: COMMERCIAL

## 2020-07-27 VITALS
DIASTOLIC BLOOD PRESSURE: 78 MMHG | HEIGHT: 63 IN | WEIGHT: 163 LBS | RESPIRATION RATE: 18 BRPM | BODY MASS INDEX: 28.88 KG/M2 | SYSTOLIC BLOOD PRESSURE: 110 MMHG | OXYGEN SATURATION: 99 % | HEART RATE: 58 BPM

## 2020-07-27 PROCEDURE — 99395 PREV VISIT EST AGE 18-39: CPT | Performed by: INTERNAL MEDICINE

## 2020-07-27 RX ORDER — ESCITALOPRAM OXALATE 10 MG/1
TABLET ORAL
Qty: 90 TABLET | Refills: 3 | Status: SHIPPED | OUTPATIENT
Start: 2020-07-27 | End: 2021-07-27 | Stop reason: SDUPTHER

## 2020-07-27 ASSESSMENT — PATIENT HEALTH QUESTIONNAIRE - PHQ9
2. FEELING DOWN, DEPRESSED OR HOPELESS: 0
SUM OF ALL RESPONSES TO PHQ9 QUESTIONS 1 & 2: 0
SUM OF ALL RESPONSES TO PHQ QUESTIONS 1-9: 0
1. LITTLE INTEREST OR PLEASURE IN DOING THINGS: 0
SUM OF ALL RESPONSES TO PHQ QUESTIONS 1-9: 0

## 2020-07-27 ASSESSMENT — ENCOUNTER SYMPTOMS
WHEEZING: 0
DIARRHEA: 0
EYE PAIN: 0
NAUSEA: 0
BLOOD IN STOOL: 0
TROUBLE SWALLOWING: 0
VOICE CHANGE: 0
COLOR CHANGE: 0
COUGH: 0
VOMITING: 0
EYE REDNESS: 0
CONSTIPATION: 0
ABDOMINAL PAIN: 0
CHEST TIGHTNESS: 0
SORE THROAT: 0
SINUS PRESSURE: 0

## 2020-07-27 NOTE — PROGRESS NOTES
Chief Complaint:   Blanche Sosa is a 30 y.o. female who presents forcomplete physical exam.    History of Present Illness:      Blanche Sosa is a 30 y.o. female who presents todayfor wellness visit AND follow up on her chronic medical conditions as noted below. Generalized anxiety disorder- lexapro seems to be doing well/ feeling better     Vitamin D deficiency- has been missing her medication     Migraine syndrome  Still issues but better on Aimovig + Botox in  Now follows dr Joann Malave    Patient Active Problem List    Diagnosis Date Noted    Migraine syndrome 2019    Photophobia 2019    Visual disturbance 2019    Anxiety 2019    Vitamin D deficiency 2018    Migraine without status migrainosus, not intractable 2018    Generalized anxiety disorder 2018    Sleep disturbance 2018    Other fatigue 2018       Past Medical History:   Diagnosis Date    Anxiety     Depression     Migraines        Past Surgical History:   Procedure Laterality Date     SECTION         Current Outpatient Medications   Medication Sig Dispense Refill    escitalopram (LEXAPRO) 10 MG tablet TAKE 1 TABLET BY MOUTH EVERY DAY 90 tablet 3    metoprolol succinate (TOPROL XL) 25 MG extended release tablet Take 25 mg by mouth daily      SUMAtriptan (IMITREX) 6 MG/0.5ML SOLN injection Inject 6 mg into the skin once as needed for Migraine      Erenumab-aooe 140 MG/ML SOAJ Inject 140 mg into the skin every 30 days 1 pen 11    SUMAtriptan (IMITREX) 100 MG tablet Take 1 tablet by mouth as needed (Take one tablet by mouth daily as needed) 30 tablet 11    Cholecalciferol (VITAMIN D) 2000 units CAPS capsule Take 1 capsule by mouth daily       No current facility-administered medications for this visit.       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   Social Needs    Financial resource strain: None    Food insecurity     Worry: None     Inability: None    Transportation needs     Medical: None     Non-medical: None   Tobacco Use    Smoking status: Never Smoker    Smokeless tobacco: Never Used   Substance and Sexual Activity    Alcohol use: Yes     Comment: Rarely    Drug use: No    Sexual activity: Yes     Partners: Male   Lifestyle    Physical activity     Days per week: None     Minutes per session: None    Stress: None   Relationships    Social connections     Talks on phone: None     Gets together: None     Attends [employer] service: None     Active member of club or organization: None     Attends meetings of clubs or organizations: None     Relationship status: None    Intimate partner violence     Fear of current or ex partner: None     Emotionally abused: None     Physically abused: None     Forced sexual activity: None   Other Topics Concern    None   Social History Narrative    None     Family History   Problem Relation Age of Onset    Diabetes Father     Heart Disease Father     Diabetes Sister           Past Surgical History:   Procedure Laterality Date     SECTION           Lab Review   Orders Only on 2020   Component Date Value    TSH 2020 1.680     Vit D, 25-Hydroxy 2020 37.5     Cholesterol, Total 2020 175     Triglycerides 2020 105     HDL 2020 45*    LDL Calculated 2020 109     Sodium 2020 141     Potassium 2020 4.4     Chloride 2020 103     CO2 2020 24     Anion Gap 2020 14     Glucose 2020 94     BUN 2020 13     CREATININE 2020 0.7     GFR Non- 2020 >60     GFR  2020 >59     Calcium 2020 9.1     Total Protein 2020 6.9     Alb 2020 4.5     Total Bilirubin 2020 0. 4     Alkaline Phosphatase 07/24/2020 85     ALT 07/24/2020 12     AST 07/24/2020 13     WBC 07/24/2020 8.9     RBC 07/24/2020 4.70     Hemoglobin 07/24/2020 14.1     Hematocrit 07/24/2020 42.2     MCV 07/24/2020 89.8     MCH 07/24/2020 30.0     MCHC 07/24/2020 33.4     RDW 07/24/2020 11.9     Platelets 01/11/5436 238     MPV 07/24/2020 10.2     Neutrophils % 07/24/2020 63.7     Lymphocytes % 07/24/2020 27.6     Monocytes % 07/24/2020 5.9     Eosinophils % 07/24/2020 1.8     Basophils % 07/24/2020 0.5     Neutrophils Absolute 07/24/2020 5.7     Immature Granulocytes # 07/24/2020 0.0     Lymphocytes Absolute 07/24/2020 2.5     Monocytes Absolute 07/24/2020 0.50     Eosinophils Absolute 07/24/2020 0.20     Basophils Absolute 07/24/2020 0.00    Office Visit on 07/15/2020   Component Date Value    Report 07/15/2020 See report     SARS-CoV-2 07/15/2020 Not Detected     This Test Sent To 07/15/2020 [facility]          Review of Systems   Constitutional: Negative for chills, fatigue and fever. HENT: Negative for congestion, ear pain, postnasal drip, sinus pressure, sore throat, trouble swallowing and voice change. Eyes: Negative for pain, redness and visual disturbance. Respiratory: Negative for cough, chest tightness and wheezing. Cardiovascular: Negative for chest pain, palpitations and leg swelling. Gastrointestinal: Negative for abdominal pain, blood in stool, constipation, diarrhea, nausea and vomiting. Endocrine: Negative for polydipsia and polyuria. Genitourinary: Negative for dysuria, enuresis, flank pain, frequency and urgency. Musculoskeletal: Negative for arthralgias, gait problem and joint swelling. Skin: Negative for color change and rash. Neurological: Positive for headaches. Negative for dizziness, tremors, syncope, facial asymmetry, speech difficulty, weakness and numbness.    Psychiatric/Behavioral: Negative for agitation, behavioral problems, confusion, sleep disturbance and suicidal ideas. The patient is not nervous/anxious. Vitals:    07/27/20 1539   BP: 110/78   Site: Left Upper Arm   Position: Sitting   Cuff Size: Large Adult   Pulse: 58   Resp: 18   SpO2: 99%   Weight: 163 lb (73.9 kg)   Height: 5' 3\" (1.6 m)      Wt Readings from Last 3 Encounters:   07/27/20 163 lb (73.9 kg)   06/01/20 145 lb (65.8 kg)   11/20/19 158 lb (71.7 kg)   Body mass index is 28.87 kg/m². BP Readings from Last 3 Encounters:   07/27/20 110/78   06/01/20 115/73   03/09/20 118/68       Physical Exam  Constitutional:       General: She is not in acute distress. Appearance: She is well-developed. She is not diaphoretic. HENT:      Head: Normocephalic and atraumatic. Nose: Nose normal.   Eyes:      General: No scleral icterus. Right eye: No discharge. Left eye: No discharge. Conjunctiva/sclera: Conjunctivae normal.      Pupils: Pupils are equal, round, and reactive to light. Neck:      Musculoskeletal: Normal range of motion. Thyroid: No thyromegaly. Vascular: No JVD. Cardiovascular:      Rate and Rhythm: Normal rate and regular rhythm. Heart sounds: No murmur. Pulmonary:      Breath sounds: Normal breath sounds. No wheezing or rales. Chest:      Chest wall: No tenderness. Abdominal:      General: Bowel sounds are normal. There is no distension. Tenderness: There is no guarding. Lymphadenopathy:      Cervical: No cervical adenopathy. Skin:     General: Skin is dry. Findings: No erythema or rash. Neurological:      Mental Status: She is oriented to person, place, and time. Cranial Nerves: No cranial nerve deficit. Coordination: Coordination normal.      Deep Tendon Reflexes: Reflexes are normal and symmetric. Psychiatric:         Behavior: Behavior normal.         Thought Content:  Thought content normal.         Judgment: Judgment normal.           ASSESSMENT/PLAN  Annual physical exam  * pap /

## 2020-08-31 ENCOUNTER — HOSPITAL ENCOUNTER (OUTPATIENT)
Dept: PAIN MANAGEMENT | Age: 35
Discharge: HOME | End: 2020-08-31
Payer: COMMERCIAL

## 2020-08-31 VITALS
DIASTOLIC BLOOD PRESSURE: 62 MMHG | TEMPERATURE: 96.4 F | RESPIRATION RATE: 20 BRPM | HEART RATE: 56 BPM | SYSTOLIC BLOOD PRESSURE: 100 MMHG | OXYGEN SATURATION: 97 %

## 2020-08-31 PROCEDURE — 64615 CHEMODENERV MUSC MIGRAINE: CPT

## 2020-08-31 PROCEDURE — 64615 CHEMODENERV MUSC MIGRAINE: CPT | Performed by: PSYCHIATRY & NEUROLOGY

## 2020-09-16 ENCOUNTER — OFFICE VISIT (OUTPATIENT)
Dept: URGENT CARE | Age: 35
End: 2020-09-16
Payer: COMMERCIAL

## 2020-09-16 VITALS
SYSTOLIC BLOOD PRESSURE: 104 MMHG | RESPIRATION RATE: 20 BRPM | WEIGHT: 163.2 LBS | HEIGHT: 63 IN | TEMPERATURE: 98.2 F | OXYGEN SATURATION: 100 % | BODY MASS INDEX: 28.92 KG/M2 | HEART RATE: 66 BPM | DIASTOLIC BLOOD PRESSURE: 74 MMHG

## 2020-09-16 PROCEDURE — 90714 TD VACC NO PRESV 7 YRS+ IM: CPT | Performed by: NURSE PRACTITIONER

## 2020-09-16 PROCEDURE — 12001 RPR S/N/AX/GEN/TRNK 2.5CM/<: CPT | Performed by: NURSE PRACTITIONER

## 2020-09-16 PROCEDURE — 90471 IMMUNIZATION ADMIN: CPT | Performed by: NURSE PRACTITIONER

## 2020-09-16 PROCEDURE — 99213 OFFICE O/P EST LOW 20 MIN: CPT | Performed by: NURSE PRACTITIONER

## 2020-09-16 RX ORDER — MUPIROCIN CALCIUM 20 MG/G
CREAM TOPICAL
Qty: 1 TUBE | Refills: 0 | Status: SHIPPED | OUTPATIENT
Start: 2020-09-16 | End: 2020-10-16

## 2020-09-16 ASSESSMENT — ENCOUNTER SYMPTOMS
EYES NEGATIVE: 1
RESPIRATORY NEGATIVE: 1
EYE ITCHING: 0
GASTROINTESTINAL NEGATIVE: 1

## 2020-09-16 NOTE — PROGRESS NOTES
[address] [facility]  [address] [facility] [address] [id number]  Dept: [phone]  Dept Fax: [phone]  Loc: [phone]     Wilfredo Mendiola is a 37 y.o. female who presents today for her medical conditions/complaintsas noted below. Wilfredo Mendiola is c/o of Hand Injury (Right) and Laceration        HPI:     HPI     Pt presents with c/o cut to right hand. States she was slicing sweet potatoes with pampered chep slicer and accidently cut hand. States full ROM, minimal bleeding. Experiencing pain 5/10. States she has applied pressure and elevated hand. Denies SOB, confusion, weakness, numbness, tingling, or any other symptom. States she has no idea when received last tetanus. Past Medical History:   Diagnosis Date    Anxiety     Depression     Migraines       Past Surgical History:   Procedure Laterality Date     SECTION         Family History   Problem Relation Age of Onset    Diabetes Father     Heart Disease Father     Diabetes Sister        Social History     Tobacco Use    Smoking status: Never Smoker    Smokeless tobacco: Never Used   Substance Use Topics    Alcohol use: Yes     Comment: Rarely      Current Outpatient Medications   Medication Sig Dispense Refill    mupirocin (BACTROBAN) 2 % cream Apply 3 times daily.  1 Tube 0    escitalopram (LEXAPRO) 10 MG tablet TAKE 1 TABLET BY MOUTH EVERY DAY 90 tablet 3    metoprolol succinate (TOPROL XL) 25 MG extended release tablet Take 25 mg by mouth daily      SUMAtriptan (IMITREX) 6 MG/0.5ML SOLN injection Inject 6 mg into the skin once as needed for Migraine      Erenumab-aooe 140 MG/ML SOAJ Inject 140 mg into the skin every 30 days 1 pen 11    SUMAtriptan (IMITREX) 100 MG tablet Take 1 tablet by mouth as needed (Take one tablet by mouth daily as needed) 30 tablet 11    Cholecalciferol (VITAMIN D) 2000 units CAPS capsule Take 1 capsule by mouth daily       No current facility-administered medications for this visit. No Known Allergies    Health Maintenance   Topic Date Due    Varicella vaccine (1 of 2 - 2-dose childhood series) 08/29/1988    HIV screen  08/29/2002    DTaP/Tdap/Td vaccine (1 - Tdap) 08/29/2006    Flu vaccine (1) 09/01/2020    Cervical cancer screen  06/03/2022    Hepatitis A vaccine  Aged Out    Hepatitis B vaccine  Aged Out    Hib vaccine  Aged Out    Meningococcal (ACWY) vaccine  Aged Out    Pneumococcal 0-64 years Vaccine  Aged Out       Subjective:     Review of Systems   Constitutional: Negative. Negative for chills and fever. HENT: Negative. Eyes: Negative. Negative for itching. Respiratory: Negative. Cardiovascular: Negative. Gastrointestinal: Negative. Genitourinary: Negative. Musculoskeletal: Negative. Skin: Positive for wound. Negative for rash. Neurological: Negative. Hematological: Negative. Psychiatric/Behavioral: Negative. All other systems reviewed and are negative. Objective:     Physical Exam  Constitutional:       General: She is not in acute distress. Appearance: She is well-developed. She is not ill-appearing or toxic-appearing. HENT:      Head: Normocephalic and atraumatic. Eyes:      Conjunctiva/sclera: Conjunctivae normal.      Pupils: Pupils are equal, round, and reactive to light. Neck:      Musculoskeletal: Normal range of motion. Trachea: Trachea normal.   Cardiovascular:      Rate and Rhythm: Normal rate and regular rhythm. Pulmonary:      Effort: Pulmonary effort is normal.      Breath sounds: Normal breath sounds. Abdominal:      General: Bowel sounds are normal.      Palpations: Abdomen is soft. Skin:     General: Skin is warm and moist.      Capillary Refill: Capillary refill takes less than 2 seconds. Findings: Erythema and wound present. Comments: Full ROM noted to thumb, hand, all fingers. Flexion and extension noted.     Neurological:      Mental Status: She is alert and oriented to person, place, and time. Psychiatric:         Speech: Speech normal.         Behavior: Behavior normal.         Thought Content: Thought content normal.         Judgment: Judgment normal.       /74   Pulse 66   Temp 98.2 °F (36.8 °C) (Oral)   Resp 20   Ht 5' 3\" (1.6 m)   Wt 163 lb 3.2 oz (74 kg)   LMP 09/01/2020   SpO2 100%   BMI 28.91 kg/m²     Assessment:          Diagnosis Orders   1. Laceration of right hand without foreign body, initial encounter  mupirocin (BACTROBAN) 2 % cream   2. Puncture wound  mupirocin (BACTROBAN) 2 % cream       Plan:    POCT:    1. Steri strips x3 applied with space left in between in case of drainage. Edges well approximated with use of dermabond. 2. No bleeding noted  3. Instructed on what to look for with infection and to follow up immediately with symptoms discussed  4. Advised to apply bactroban ointment 4 times a day  5. Pt agreed to plan  Orders Placed This Encounter   Procedures    Td (adult), 2 Lf Tetanus Toxoid, PF (Td, absorbed)        No follow-ups on file. Orders Placed This Encounter   Procedures    Td (adult), 2 Lf Tetanus Toxoid, PF (Td, absorbed)     Orders Placed This Encounter   Medications    mupirocin (BACTROBAN) 2 % cream     Sig: Apply 3 times daily. Dispense:  1 Tube     Refill:  0       Patient given educationalmaterials - see patient instructions. Discussed use, benefit, and side effectsof prescribed medications. All patient questions answered. Pt voiced understanding. Reviewed health maintenance. Instructed to continue current medications, diet andexercise. Patient agreed with treatment plan. Follow up as directed. Patient Instructions   1. Keep steri-strips clean and dry today and tomorrow  2. May get wet after tomorrow  3.apply antibiotic ointment 4 times a day  4. Watch for signs of infection: red streaks, warmth to hand, fever and follow up immediately  5.  Follow up as needed        Electronically signed by ANISHA Morris - CNP on 9/17/2020 at 4:42 PM

## 2020-09-16 NOTE — PATIENT INSTRUCTIONS
1. Keep steri-strips clean and dry today and tomorrow  2. May get wet after tomorrow  3.apply antibiotic ointment 4 times a day  4. Watch for signs of infection: red streaks, warmth to hand, fever and follow up immediately  5.  Follow up as needed

## 2020-09-28 ENCOUNTER — TELEPHONE (OUTPATIENT)
Dept: NEUROSURGERY | Age: 35
End: 2020-09-28

## 2020-09-28 NOTE — TELEPHONE ENCOUNTER
Patient called and states that since her last botox injection, she has been having some drooping of her  Left eye and  No movement on her left eyebrow.   She is asking, what can be done about this'?:  \

## 2020-11-05 ENCOUNTER — OFFICE VISIT (OUTPATIENT)
Dept: OBSTETRICS AND GYNECOLOGY | Facility: CLINIC | Age: 35
End: 2020-11-05

## 2020-11-05 VITALS
HEIGHT: 63 IN | DIASTOLIC BLOOD PRESSURE: 72 MMHG | WEIGHT: 167 LBS | BODY MASS INDEX: 29.59 KG/M2 | SYSTOLIC BLOOD PRESSURE: 112 MMHG

## 2020-11-05 DIAGNOSIS — Z01.419 WELL WOMAN EXAM WITH ROUTINE GYNECOLOGICAL EXAM: Primary | ICD-10-CM

## 2020-11-05 DIAGNOSIS — Z30.431 ENCOUNTER FOR ROUTINE CHECKING OF INTRAUTERINE CONTRACEPTIVE DEVICE (IUD): ICD-10-CM

## 2020-11-05 DIAGNOSIS — Z86.69 HISTORY OF MIGRAINE HEADACHES: ICD-10-CM

## 2020-11-05 PROCEDURE — 87624 HPV HI-RISK TYP POOLED RSLT: CPT | Performed by: NURSE PRACTITIONER

## 2020-11-05 PROCEDURE — G0123 SCREEN CERV/VAG THIN LAYER: HCPCS | Performed by: NURSE PRACTITIONER

## 2020-11-05 PROCEDURE — 99395 PREV VISIT EST AGE 18-39: CPT | Performed by: NURSE PRACTITIONER

## 2020-11-05 RX ORDER — METOPROLOL SUCCINATE 25 MG/1
25 TABLET, EXTENDED RELEASE ORAL
COMMUNITY
Start: 2020-08-17

## 2020-11-05 RX ORDER — SUMATRIPTAN 6 MG/.5ML
6 INJECTION, SOLUTION SUBCUTANEOUS
COMMUNITY

## 2020-11-05 RX ORDER — ERENUMAB-AOOE 140 MG/ML
INJECTION, SOLUTION SUBCUTANEOUS
COMMUNITY
Start: 2020-10-27

## 2020-11-05 RX ORDER — SUMATRIPTAN 100 MG/1
100 TABLET, FILM COATED ORAL
COMMUNITY

## 2020-11-05 NOTE — PROGRESS NOTES
"Subjective     Yadira Wallace is a 35 y.o. female    Patient is here today for yearly checkup.  She is contemplating having her IUD removed but has not made her mind up for sure.  That they \"they want to have another baby.  She has been having significant migraines and is tried most medications.  She is currently on Aimovig and Botox and states that it is working.    Gynecologic Exam  The patient's pertinent negatives include no pelvic pain, vaginal bleeding or vaginal discharge. The patient is experiencing no pain. Associated symptoms include headaches. Pertinent negatives include no abdominal pain, anorexia, back pain, chills, constipation, diarrhea, discolored urine, dysuria, fever, flank pain, frequency, hematuria, joint pain, joint swelling, nausea, painful intercourse, rash, sore throat, urgency or vomiting. She is sexually active. She uses an IUD for contraception. Her menstrual history has been irregular.         /72   Ht 160 cm (62.99\")   Wt 75.8 kg (167 lb)   LMP  (LMP Unknown)   BMI 29.59 kg/m²     Outpatient Encounter Medications as of 11/5/2020   Medication Sig Dispense Refill   • Aimovig 140 MG/ML prefilled syringe INJECT 140 MG INTO THE SKIN EVERY 30 DAYS     • Cholecalciferol (VITAMIN D) 2000 units capsule Take 1 capsule by mouth.     • escitalopram (LEXAPRO) 10 MG tablet TAKE 1 TABLET BY MOUTH EVERY DAY     • Fremanezumab-vfrm (AJOVY) 225 MG/1.5ML solution prefilled syringe Inject 225 mg under the skin into the appropriate area as directed.     • levonorgestrel (MIRENA, 52 MG,) 20 MCG/24HR IUD 1 each by Intrauterine route 1 (One) Time. 8/2016     • metoprolol succinate XL (TOPROL-XL) 25 MG 24 hr tablet Take 25 mg by mouth.     • OnabotulinumtoxinA 200 units reconstituted solution Inject as directed.     • SUMAtriptan (IMITREX) 100 MG tablet Take 100 mg by mouth.     • SUMAtriptan (IMITREX) 6 MG/0.5ML injection Inject prescribed dose at onset of headache. May repeat dose one time in 1 hour(s) " if headache not relieved.       No facility-administered encounter medications on file as of 2020.        Surgical History  Past Surgical History:   Procedure Laterality Date   •  SECTION     • MYRINGOTOMY W/ TUBES Bilateral     x2 (as a child)       Family History  Family History   Problem Relation Age of Onset   • Hypertension Father    • Diabetes Father         type 1   • Other Father         CABG   • Coronary artery disease Father    • Diabetes Sister         Type 1, In stable health   • No Known Problems Mother    • No Known Problems Brother    • Colon cancer Maternal Grandmother 80   • Lung cancer Maternal Grandfather    • Breast cancer Neg Hx    • Ovarian cancer Neg Hx    • Uterine cancer Neg Hx    • Melanoma Neg Hx    • Prostate cancer Neg Hx        The following portions of the patient's history were reviewed and updated as appropriate: allergies, current medications, past family history, past medical history, past social history, past surgical history and problem list.    Review of Systems   Constitutional: Negative for activity change, appetite change, chills, diaphoresis, fatigue, fever, unexpected weight gain and unexpected weight loss.   HENT: Negative for congestion, dental problem, drooling, ear discharge, ear pain, facial swelling, hearing loss, mouth sores, nosebleeds, postnasal drip, rhinorrhea, sinus pressure, sneezing, sore throat, swollen glands, tinnitus, trouble swallowing and voice change.    Eyes: Negative for blurred vision, double vision, photophobia, pain, discharge, redness, itching and visual disturbance.   Respiratory: Negative for apnea, cough, choking, chest tightness, shortness of breath, wheezing and stridor.    Cardiovascular: Negative for chest pain, palpitations and leg swelling.   Gastrointestinal: Negative for abdominal distention, abdominal pain, anal bleeding, anorexia, blood in stool, constipation, diarrhea, nausea, rectal pain, vomiting, GERD and indigestion.    Endocrine: Negative for cold intolerance, heat intolerance, polydipsia, polyphagia and polyuria.   Genitourinary: Negative for amenorrhea, breast discharge, breast lump, breast pain, decreased libido, decreased urine volume, difficulty urinating, dyspareunia, dysuria, flank pain, frequency, genital sores, hematuria, menstrual problem, pelvic pain, pelvic pressure, urgency, urinary incontinence, vaginal bleeding, vaginal discharge and vaginal pain.   Musculoskeletal: Negative for arthralgias, back pain, gait problem, joint pain, joint swelling, myalgias, neck pain, neck stiffness and bursitis.   Skin: Negative for color change, dry skin and rash.   Allergic/Immunologic: Negative for environmental allergies, food allergies and immunocompromised state.   Neurological: Positive for headache. Negative for dizziness, tremors, seizures, syncope, facial asymmetry, speech difficulty, weakness, light-headedness, numbness, memory problem and confusion.   Hematological: Negative for adenopathy. Does not bruise/bleed easily.   Psychiatric/Behavioral: Negative for agitation, behavioral problems, decreased concentration, dysphoric mood, hallucinations, self-injury, sleep disturbance, suicidal ideas, negative for hyperactivity, depressed mood and stress. The patient is not nervous/anxious.        Objective   Physical Exam  Vitals signs and nursing note reviewed. Exam conducted with a chaperone present.   Constitutional:       General: She is not in acute distress.     Appearance: She is well-developed. She is not diaphoretic.   HENT:      Head: Normocephalic.      Right Ear: External ear normal.      Left Ear: External ear normal.      Nose: Nose normal.   Eyes:      General: No scleral icterus.        Right eye: No discharge.         Left eye: No discharge.      Conjunctiva/sclera: Conjunctivae normal.   Neck:      Musculoskeletal: Normal range of motion and neck supple.      Thyroid: No thyromegaly.      Vascular: No carotid  bruit.      Trachea: No tracheal deviation.   Cardiovascular:      Rate and Rhythm: Normal rate and regular rhythm.      Heart sounds: Normal heart sounds. No murmur.   Pulmonary:      Effort: Pulmonary effort is normal. No respiratory distress.      Breath sounds: Normal breath sounds. No wheezing.   Chest:      Breasts: Breasts are symmetrical.         Right: No inverted nipple, mass, nipple discharge, skin change or tenderness.         Left: No inverted nipple, mass, nipple discharge, skin change or tenderness.   Abdominal:      General: There is no distension.      Palpations: Abdomen is soft. There is no mass.      Tenderness: There is no abdominal tenderness. There is no guarding.      Hernia: No hernia is present. There is no hernia in the left inguinal area or right inguinal area.   Genitourinary:     General: Normal vulva.      Exam position: Lithotomy position.      Labia:         Right: No rash, tenderness, lesion or injury.         Left: No rash, tenderness, lesion or injury.       Vagina: Normal. No signs of injury and foreign body. No vaginal discharge, erythema, tenderness or bleeding.      Cervix: Normal.      Uterus: Normal. Not enlarged, not fixed and not tender.       Adnexa: Right adnexa normal and left adnexa normal.        Right: No mass, tenderness or fullness.          Left: No mass, tenderness or fullness.        Rectum: Normal. No mass.            Comments:   BSU normal  Urethral meatus  Normal  Perineum  Normal  Musculoskeletal: Normal range of motion.         General: No tenderness.   Lymphadenopathy:      Head:      Right side of head: No submental, submandibular, tonsillar, preauricular, posterior auricular or occipital adenopathy.      Left side of head: No submental, submandibular, tonsillar, preauricular, posterior auricular or occipital adenopathy.      Cervical: No cervical adenopathy.      Right cervical: No superficial, deep or posterior cervical adenopathy.     Left cervical: No  superficial, deep or posterior cervical adenopathy.      Lower Body: No right inguinal adenopathy. No left inguinal adenopathy.   Skin:     General: Skin is warm and dry.      Findings: No bruising, erythema or rash.   Neurological:      Mental Status: She is alert and oriented to person, place, and time.      Coordination: Coordination normal.   Psychiatric:         Mood and Affect: Mood normal.         Behavior: Behavior normal.         Thought Content: Thought content normal.         Judgment: Judgment normal.         Assessment/Plan   Diagnoses and all orders for this visit:    1. Well woman exam with routine gynecological exam (Primary)  Normal GYN exam. Will have lab work at PCP. Encouraged SBE, pt is aware how to do self breast exam and the importance of same. Discussed weight management and importance of maintaining a healthy weight. Discussed Vitamin D intake and the importance of adequate vitamin D for both Bone Health and a healthy immune system.  Discussed Daily exercise and the importance of same, in regards to a healthy heart as well as helping to maintain her weight and improving her mental health.  BMI 29.6.  Pap smear is done per ASCCP guidelines.  -     Liquid-based Pap Smear, Screening    2. Encounter for routine checking of intrauterine contraceptive device (IUD)  Comments:  IUD strings are not seen.  They have been checked per ultrasound and will present.  Patient is contemplating having her IUD out she is thinking she might want to become pregnant.  Her IUD expires next August.  She will return to office if she decides she wants to proceed with IUD removal.    3. History of migraine headaches  Comments:  Patient is followed by neuro and is currently on Aimovig and Botox for her headaches and is much improved.  We discussed that she will have to stop a lot of her medications if she gets pregnant.         Patient's Body mass index is 29.59 kg/m². BMI is above normal parameters. Recommendations  include: educational material, exercise counseling and nutrition counseling.      Amrita William, APRN  11/5/2020

## 2020-11-05 NOTE — PATIENT INSTRUCTIONS
"BMI for Adults  What is BMI?  Body mass index (BMI) is a number that is calculated from a person's weight and height. BMI can help estimate how much of a person's weight is composed of fat. BMI does not measure body fat directly. Rather, it is an alternative to procedures that directly measure body fat, which can be difficult and expensive.  BMI can help identify people who may be at higher risk for certain medical problems.  What are BMI measurements used for?  BMI is used as a screening tool to identify possible weight problems. It helps determine whether a person is obese, overweight, a healthy weight, or underweight.  BMI is useful for:  · Identifying a weight problem that may be related to a medical condition or may increase the risk for medical problems.  · Promoting changes, such as changes in diet and exercise, to help reach a healthy weight. BMI screening can be repeated to see if these changes are working.  How is BMI calculated?  BMI involves measuring your weight in relation to your height. Both height and weight are measured, and the BMI is calculated from those numbers. This can be done either in English (U[place] or metric measurements. Note that charts and online BMI calculators are available to help you find your BMI quickly and easily without having to do these calculations yourself.  To calculate your BMI in English (U.S[place] measurements:    1. Measure your weight in pounds (lb).  2. Multiply the number of pounds by 703.  ? For example, for a person who weighs 180 lb, multiply that number by 703, which equals 126,540.  3. Measure your height in inches. Then multiply that number by itself to get a measurement called \"inches squared.\"  ? For example, for a person who is 70 inches tall, the \"inches squared\" measurement is 70 inches x 70 inches, which equals 4,900 inches squared.  4. Divide the total from step 2 (number of lb x 703) by the total from step 3 (inches squared): 126,540 ÷ 4,900 = 25.8. This is " "your BMI.  To calculate your BMI in metric measurements:  1. Measure your weight in kilograms (kg).  2. Measure your height in meters (m). Then multiply that number by itself to get a measurement called \"meters squared.\"  ? For example, for a person who is 1.75 m tall, the \"meters squared\" measurement is 1.75 m x 1.75 m, which is equal to 3.1 meters squared.  3. Divide the number of kilograms (your weight) by the meters squared number. In this example: 70 ÷ 3.1 = 22.6. This is your BMI.  What do the results mean?  BMI charts are used to identify whether you are underweight, normal weight, overweight, or obese. The following guidelines will be used:  · Underweight: BMI less than 18.5.  · Normal weight: BMI between 18.5 and 24.9.  · Overweight: BMI between 25 and 29.9.  · Obese: BMI of 30 or above.  Keep these notes in mind:  · Weight includes both fat and muscle, so someone with a muscular build, such as an athlete, may have a BMI that is higher than 24.9. In cases like these, BMI is not an accurate measure of body fat.  · To determine if excess body fat is the cause of a BMI of 25 or higher, further assessments may need to be done by a health care provider.  · BMI is usually interpreted in the same way for men and women.  Where to find more information  For more information about BMI, including tools to quickly calculate your BMI, go to these websites:  · Centers for Disease Control and Prevention: www.cdc.gov  · American Heart Association: www.heart.org  · National Heart, Lung, and Blood Bureau: www.nhlbi.nih.gov  Summary  · Body mass index (BMI) is a number that is calculated from a person's weight and height.  · BMI may help estimate how much of a person's weight is composed of fat. BMI can help identify those who may be at higher risk for certain medical problems.  · BMI can be measured using English measurements or metric measurements.  · BMI charts are used to identify whether you are underweight, normal " weight, overweight, or obese.  This information is not intended to replace advice given to you by your health care provider. Make sure you discuss any questions you have with your health care provider.  Document Released: 08/29/2005 Document Revised: 09/09/2020 Document Reviewed: 07/17/2020  Elsevier Patient Education © 2020 [employer]

## 2020-11-06 LAB
GEN CATEG CVX/VAG CYTO-IMP: NORMAL
HPV I/H RISK 4 DNA CVX QL PROBE+SIG AMP: NOT DETECTED
LAB AP CASE REPORT: NORMAL
LAB AP GYN ADDITIONAL INFORMATION: NORMAL
PATH INTERP SPEC-IMP: NORMAL
STAT OF ADQ CVX/VAG CYTO-IMP: NORMAL

## 2020-11-23 ENCOUNTER — HOSPITAL ENCOUNTER (OUTPATIENT)
Dept: PAIN MANAGEMENT | Age: 35
Discharge: HOME | End: 2020-11-23
Payer: COMMERCIAL

## 2020-11-23 VITALS
OXYGEN SATURATION: 100 % | TEMPERATURE: 96.2 F | RESPIRATION RATE: 18 BRPM | DIASTOLIC BLOOD PRESSURE: 79 MMHG | HEART RATE: 59 BPM | SYSTOLIC BLOOD PRESSURE: 113 MMHG

## 2020-11-23 PROCEDURE — 64615 CHEMODENERV MUSC MIGRAINE: CPT | Performed by: PSYCHIATRY & NEUROLOGY

## 2020-11-23 PROCEDURE — 64615 CHEMODENERV MUSC MIGRAINE: CPT

## 2020-11-30 RX ORDER — ERENUMAB-AOOE 140 MG/ML
INJECTION, SOLUTION SUBCUTANEOUS
Qty: 1 PEN | Refills: 11 | Status: SHIPPED | OUTPATIENT
Start: 2020-11-30 | End: 2021-11-22

## 2020-11-30 NOTE — TELEPHONE ENCOUNTER
Requested Prescriptions     Pending Prescriptions Disp Refills    AIMOVIG 140 MG/ML SOAJ [Pharmacy Med Name: AIMOVIG 140 MG/ML AUTOINJECTOR] 1 pen 11     Sig: INJECT 140 MG INTO THE SKIN EVERY 27 DAYS       Last Office Visit: 11/20/2019  Next Office Visit: 2/15/21  Last Medication Refill: 11/20/19 with 11 refills

## 2020-12-04 ENCOUNTER — TELEPHONE (OUTPATIENT)
Dept: NEUROLOGY | Age: 35
End: 2020-12-04

## 2020-12-04 RX ORDER — SUMATRIPTAN 100 MG/1
TABLET, FILM COATED ORAL
Qty: 18 TABLET | Refills: 0 | Status: SHIPPED | OUTPATIENT
Start: 2020-12-04 | End: 2021-02-17

## 2020-12-04 NOTE — TELEPHONE ENCOUNTER
The PA has been submitted. I called the patient and left her a message letting her know and I also let her know that she can download the savings card and fill it out and take it to the pharmacy.

## 2020-12-04 NOTE — TELEPHONE ENCOUNTER
Sonny Vasquez requests that nurse return their call. The best time to reach her is Anytime. Pt called stating that prescript AIMOVIG 140 MG/ML SOAJ was not approved by insurance per [facility]. Please contact pt to discuss more options. Thank you.

## 2020-12-08 NOTE — TELEPHONE ENCOUNTER
Called to inform the patient ,unablke to leave message that the prior authorization for Govind Ledbetter was approved. See additional information in Media .

## 2021-03-01 ENCOUNTER — HOSPITAL ENCOUNTER (OUTPATIENT)
Dept: PAIN MANAGEMENT | Age: 36
Discharge: HOME | End: 2021-03-01
Payer: COMMERCIAL

## 2021-03-01 VITALS
HEART RATE: 58 BPM | SYSTOLIC BLOOD PRESSURE: 112 MMHG | DIASTOLIC BLOOD PRESSURE: 65 MMHG | TEMPERATURE: 96.9 F | RESPIRATION RATE: 20 BRPM | OXYGEN SATURATION: 99 %

## 2021-03-01 PROCEDURE — 64615 CHEMODENERV MUSC MIGRAINE: CPT

## 2021-03-01 PROCEDURE — 64615 CHEMODENERV MUSC MIGRAINE: CPT | Performed by: PSYCHIATRY & NEUROLOGY

## 2021-03-01 NOTE — PROCEDURES
[address]    Patient Name: Michel Harding    : 1985                    Age: 37 y.o. Sex: female    Date: 2021    Pre-op Diagnosis: Chronic Migraines    Post-op Diagnosis: Chronic Migraines    Procedure: Botox injections x 155 units (45 units wasted) for migraine    Performing Procedure:  Dr Jodie Irby for the past 24 hrs:   BP Temp Temp src Pulse Resp SpO2   21 1127 112/65 96.9 °F (36.1 °C) Temporal 58 20 99 %       Previous Injections:  Patient had 10 migraines and 4-5 headaches over the past month. At least 100 units or greater of Botox was used. The patient had reduction in migraines and was able to increase their activity level post treatment with Botox    Indications:    Michel Harding has been treated for problems with chronic migraine headaches. The patient was taken through a conservative course of treatment without complete resolution of symptoms. Botox injection therapy was offered and after the risks and benefits of the procedure were explained to the patient, we had agreed to proceed. The patient was taken to the procedure room and placed in the seated position. The following muscles were identified using palpation and standard landmarks. These muscles were marked and prepped with alcohol. A total of 155 units were injected after careful aspiration and the following distribution bilaterally:  10 units in 2 sites, procerus 5 units in one site, frontalis 20 units in 4 sites, temporalis 40 unit in 8 sites, occipitals 30 units in 6 sites, cervical paraspinals 20 units in 4 sites, and trapezius 30 units in 6 sites. Discharge: The patient tolerated the procedure well. There were no complications during the procedure and the patient was discharged home with discharge instructions.     The patient has been instructed to contact the office should there be any complications or questions to arise between today and their next appointment.     Plan:  [] Will return to the office in   [] 1 month  [] 4 - 6 weeks  [] 8 weeks   [x] 12 weeks:  [x] Procedure Follow-up   [] Office Visit      Maritza Villafuerte MD, 3/1/2021 at 11:51 AM

## 2021-03-01 NOTE — PROGRESS NOTES
Procedure:  Level of Consciousness: [x]Alert [x]Oriented []Disoriented []Lethargic  Anxiety Level: [x]Calm []Anxious []Depressed []Other  Skin: [x]Warm [x]Dry []Cool []Moist []Intact []Other  Cardiovascular: []Palpitations: []Never []Occasionally []Frequently  Chest Pain: [x]No []Yes  Respiratory:  [x]Unlabored []Labored []Cough ([] Productive []Unproductive)  HCG Required: [x]No []Yes   Results: []Negative []Positive  Knowledge Level:        [x]Patient/Other verbalized understanding of pre-procedure instructions. []Assessment of post-op care needs (transportation, responsible caregiver)        []Able to discuss health care problems and how to deal with it. Factors that Affect Teaching:        Language Barrier: [x]No []Yes - why:        Hearing Loss:        [x]No []Yes            Corrective Device:  []Yes []No        Vision Loss:           [x]No []Yes            Corrective Device:  []Yes []No        Memory Loss:       [x]No []Yes            []Short Term []Long Term  Motivational Level:  [x]Asks Questions                  []Extremely Anxious       [x]Seems Interested               []Seems Uninterested                  [x]Denies need for Education  Risk for Injury:  [x]Patient oriented to person, place and time  []History of frequent falls/loss of balance  Nutritional:  []Change in appetite   []Weight Gain   []Weight Loss  Functional:  []Requires assistance with ADL's      Botox Assessment  [x] Patient  has had a reduction of at least 100 hours (5 Days) in Migraines since receiving Botox  [x] Patient has been able to increase activity with Botox injections  []  []  Factors that Affect Teaching:  Assessment of post-op care needs (transportation, responsible caregiver)        [x]Able to discuss health care problems and how to deal with it.   Factors that Affect Teaching:

## 2021-06-14 ENCOUNTER — HOSPITAL ENCOUNTER (OUTPATIENT)
Dept: PAIN MANAGEMENT | Age: 36
Discharge: HOME | End: 2021-06-14
Payer: COMMERCIAL

## 2021-06-14 VITALS
TEMPERATURE: 95.9 F | OXYGEN SATURATION: 100 % | SYSTOLIC BLOOD PRESSURE: 101 MMHG | HEART RATE: 63 BPM | DIASTOLIC BLOOD PRESSURE: 65 MMHG | RESPIRATION RATE: 18 BRPM

## 2021-06-14 PROCEDURE — 6360000002 HC RX W HCPCS

## 2021-06-14 PROCEDURE — 64615 CHEMODENERV MUSC MIGRAINE: CPT | Performed by: PSYCHIATRY & NEUROLOGY

## 2021-06-14 PROCEDURE — 64615 CHEMODENERV MUSC MIGRAINE: CPT

## 2021-06-14 NOTE — PROCEDURES
[address]    Patient Name: Nina Ortiz    : 1985                    Age: 37 y.o. Sex: female    Date: 2021    Pre-op Diagnosis: Chronic Migraines    Post-op Diagnosis: Chronic Migraines    Procedure: Botox injections x 155 units (45 units wasted) for migraine    Performing Procedure:  Dr Elena Ahn for the past 24 hrs:   BP Temp Temp src Pulse Resp SpO2   21 1035 101/65 95.9 °F (35.5 °C) Tympanic 63 18 100 %       Previous Injections:  Patient had 5 migraines and 3 headaches over the past month. At least 100 units or greater of Botox was used. The patient had reduction in migraines and was able to increase their activity level post treatment with Botox    Indications:    Nina Ortiz has been treated for problems with chronic migraine headaches. The patient was taken through a conservative course of treatment without complete resolution of symptoms. Botox injection therapy was offered and after the risks and benefits of the procedure were explained to the patient, we had agreed to proceed. The patient was taken to the procedure room and placed in the seated position. The following muscles were identified using palpation and standard landmarks. These muscles were marked and prepped with alcohol. A total of 155 units were injected after careful aspiration and the following distribution bilaterally:  10 units in 2 sites, procerus 5 units in one site, frontalis 20 units in 4 sites, temporalis 40 unit in 8 sites, occipitals 30 units in 6 sites, cervical paraspinals 20 units in 4 sites, and trapezius 30 units in 6 sites. Discharge: The patient tolerated the procedure well. There were no complications during the procedure and the patient was discharged home with discharge instructions.     The patient has been instructed to contact the office should there be any complications or questions to arise between today and their next appointment.     Plan:  [] Will return to the office in   [] 1 month  [] 4 - 6 weeks  [] 8 weeks   [x] 12 weeks:  [x] Procedure Follow-up   [] Office Visit      Gilford Homes, MD, 6/14/2021 at 1:37 PM

## 2021-06-14 NOTE — PROGRESS NOTES
Procedure:  Level of Consciousness: [x]Alert [x]Oriented []Disoriented []Lethargic  Anxiety Level: [x]Calm []Anxious []Depressed []Other  Skin: [x]Warm [x]Dry []Cool []Moist []Intact []Other  Cardiovascular: []Palpitations: []Never []Occasionally []Frequently  Chest Pain: [x]No []Yes  Respiratory:  [x]Unlabored []Labored []Cough ([] Productive []Unproductive)  HCG Required: [x]No []Yes   Results: []Negative []Positive  Knowledge Level:        []Patient/Other verbalized understanding of pre-procedure instructions. []Assessment of post-op care needs (transportation, responsible caregiver)        []Able to discuss health care problems and how to deal with it. Factors that Affect Teaching:        Language Barrier: []No []Yes - why:        Hearing Loss:        []No []Yes            Corrective Device:  []Yes []No        Vision Loss:           []No []Yes            Corrective Device:  []Yes []No        Memory Loss:       []No []Yes            []Short Term []Long Term  Motivational Level:  []Asks Questions                  []Extremely Anxious       []Seems Interested               []Seems Uninterested                  []Denies need for Education  Risk for Injury:  []Patient oriented to person, place and time  []History of frequent falls/loss of balance  Nutritional:  []Change in appetite   []Weight Gain   []Weight Loss  Functional:  []Requires assistance with ADL's      Botox Assessment  [x] Patient  has had a reduction of at least 100 hours (5 Days) in Migraines since receiving Botox  [x] Patient has been able to increase activity with Botox injections  []  []  Factors that Affect Teaching:  Assessment of post-op care needs (transportation, responsible caregiver)        []Able to discuss health care problems and how to deal with it. Factors that Affect Teaching:Patient states that he/she has ____2-3__ headaches out of 30 days each month.   Patient states that he/she has 5_ migraines out of 30 days each

## 2021-06-28 ENCOUNTER — TELEPHONE (OUTPATIENT)
Dept: PRIMARY CARE CLINIC | Age: 36
End: 2021-06-28

## 2021-06-28 RX ORDER — SULFAMETHOXAZOLE AND TRIMETHOPRIM 800; 160 MG/1; MG/1
1 TABLET ORAL 2 TIMES DAILY
Qty: 14 TABLET | Refills: 0 | Status: SHIPPED | OUTPATIENT
Start: 2021-06-28 | End: 2021-07-05

## 2021-07-22 DIAGNOSIS — Z00.00 ANNUAL PHYSICAL EXAM: ICD-10-CM

## 2021-07-22 DIAGNOSIS — G43.909 MIGRAINE SYNDROME: ICD-10-CM

## 2021-07-22 DIAGNOSIS — F41.1 GENERALIZED ANXIETY DISORDER: ICD-10-CM

## 2021-07-22 DIAGNOSIS — Z11.4 SCREENING FOR HIV (HUMAN IMMUNODEFICIENCY VIRUS): ICD-10-CM

## 2021-07-22 DIAGNOSIS — E55.9 VITAMIN D DEFICIENCY: ICD-10-CM

## 2021-07-22 LAB
ALBUMIN SERPL-MCNC: 4.2 G/DL (ref 3.5–5.2)
ALP BLD-CCNC: 99 U/L (ref 35–104)
ALT SERPL-CCNC: 16 U/L (ref 5–33)
ANION GAP SERPL CALCULATED.3IONS-SCNC: 10 MMOL/L (ref 7–19)
AST SERPL-CCNC: 17 U/L (ref 5–32)
BASOPHILS ABSOLUTE: 0 K/UL (ref 0–0.2)
BASOPHILS RELATIVE PERCENT: 0.6 % (ref 0–1)
BILIRUB SERPL-MCNC: 0.4 MG/DL (ref 0.2–1.2)
BILIRUBIN URINE: NEGATIVE
BLOOD, URINE: NEGATIVE
BUN BLDV-MCNC: 14 MG/DL (ref 6–20)
CALCIUM SERPL-MCNC: 9 MG/DL (ref 8.6–10)
CHLORIDE BLD-SCNC: 106 MMOL/L (ref 98–111)
CHOLESTEROL, TOTAL: 185 MG/DL (ref 160–199)
CLARITY: CLEAR
CO2: 23 MMOL/L (ref 22–29)
COLOR: YELLOW
CREAT SERPL-MCNC: 0.7 MG/DL (ref 0.5–0.9)
EOSINOPHILS ABSOLUTE: 0.2 K/UL (ref 0–0.6)
EOSINOPHILS RELATIVE PERCENT: 2.8 % (ref 0–5)
GFR AFRICAN AMERICAN: >59
GFR NON-AFRICAN AMERICAN: >60
GLUCOSE BLD-MCNC: 85 MG/DL (ref 74–109)
GLUCOSE URINE: NEGATIVE MG/DL
HBA1C MFR BLD: 4.7 % (ref 4–6)
HCT VFR BLD CALC: 42.1 % (ref 37–47)
HDLC SERPL-MCNC: 40 MG/DL (ref 65–121)
HEMOGLOBIN: 14.1 G/DL (ref 12–16)
HIV-1 P24 AG: NORMAL
IMMATURE GRANULOCYTES #: 0 K/UL
KETONES, URINE: NEGATIVE MG/DL
LDL CHOLESTEROL CALCULATED: 102 MG/DL
LEUKOCYTE ESTERASE, URINE: NEGATIVE
LYMPHOCYTES ABSOLUTE: 2.5 K/UL (ref 1.1–4.5)
LYMPHOCYTES RELATIVE PERCENT: 34.9 % (ref 20–40)
MCH RBC QN AUTO: 30.7 PG (ref 27–31)
MCHC RBC AUTO-ENTMCNC: 33.5 G/DL (ref 33–37)
MCV RBC AUTO: 91.5 FL (ref 81–99)
MONOCYTES ABSOLUTE: 0.5 K/UL (ref 0–0.9)
MONOCYTES RELATIVE PERCENT: 7 % (ref 0–10)
NEUTROPHILS ABSOLUTE: 3.9 K/UL (ref 1.5–7.5)
NEUTROPHILS RELATIVE PERCENT: 54.3 % (ref 50–65)
NITRITE, URINE: NEGATIVE
PDW BLD-RTO: 13 % (ref 11.5–14.5)
PH UA: 6.5 (ref 5–8)
PLATELET # BLD: 241 K/UL (ref 130–400)
PMV BLD AUTO: 10 FL (ref 9.4–12.3)
POTASSIUM SERPL-SCNC: 4.2 MMOL/L (ref 3.5–5)
PROTEIN UA: NEGATIVE MG/DL
RAPID HIV 1&2: NORMAL
RBC # BLD: 4.6 M/UL (ref 4.2–5.4)
SODIUM BLD-SCNC: 139 MMOL/L (ref 136–145)
SPECIFIC GRAVITY UA: 1.02 (ref 1–1.03)
TOTAL PROTEIN: 7 G/DL (ref 6.6–8.7)
TRIGL SERPL-MCNC: 214 MG/DL (ref 0–149)
TSH SERPL DL<=0.05 MIU/L-ACNC: 1.4 UIU/ML (ref 0.27–4.2)
UROBILINOGEN, URINE: 1 E.U./DL
VITAMIN D 25-HYDROXY: 36.6 NG/ML
WBC # BLD: 7.1 K/UL (ref 4.8–10.8)

## 2021-07-27 ENCOUNTER — OFFICE VISIT (OUTPATIENT)
Dept: INTERNAL MEDICINE | Age: 36
End: 2021-07-27
Payer: COMMERCIAL

## 2021-07-27 VITALS
WEIGHT: 174 LBS | HEART RATE: 66 BPM | HEIGHT: 63 IN | DIASTOLIC BLOOD PRESSURE: 60 MMHG | BODY MASS INDEX: 30.83 KG/M2 | OXYGEN SATURATION: 99 % | SYSTOLIC BLOOD PRESSURE: 110 MMHG

## 2021-07-27 DIAGNOSIS — Z00.00 ANNUAL PHYSICAL EXAM: Primary | ICD-10-CM

## 2021-07-27 DIAGNOSIS — G43.909 MIGRAINE SYNDROME: ICD-10-CM

## 2021-07-27 DIAGNOSIS — F41.1 GENERALIZED ANXIETY DISORDER: ICD-10-CM

## 2021-07-27 DIAGNOSIS — E55.9 VITAMIN D DEFICIENCY: ICD-10-CM

## 2021-07-27 PROCEDURE — 99395 PREV VISIT EST AGE 18-39: CPT | Performed by: INTERNAL MEDICINE

## 2021-07-27 RX ORDER — TOPIRAMATE 50 MG/1
50 TABLET, FILM COATED ORAL NIGHTLY
COMMUNITY
Start: 2021-01-29 | End: 2022-01-29

## 2021-07-27 RX ORDER — ESCITALOPRAM OXALATE 10 MG/1
TABLET ORAL
Qty: 90 TABLET | Refills: 3 | Status: SHIPPED | OUTPATIENT
Start: 2021-07-27 | End: 2022-10-05

## 2021-07-27 SDOH — ECONOMIC STABILITY: FOOD INSECURITY: WITHIN THE PAST 12 MONTHS, THE FOOD YOU BOUGHT JUST DIDN'T LAST AND YOU DIDN'T HAVE MONEY TO GET MORE.: NEVER TRUE

## 2021-07-27 SDOH — ECONOMIC STABILITY: FOOD INSECURITY: WITHIN THE PAST 12 MONTHS, YOU WORRIED THAT YOUR FOOD WOULD RUN OUT BEFORE YOU GOT MONEY TO BUY MORE.: NEVER TRUE

## 2021-07-27 ASSESSMENT — PATIENT HEALTH QUESTIONNAIRE - PHQ9
SUM OF ALL RESPONSES TO PHQ9 QUESTIONS 1 & 2: 0
1. LITTLE INTEREST OR PLEASURE IN DOING THINGS: 0
2. FEELING DOWN, DEPRESSED OR HOPELESS: 0
SUM OF ALL RESPONSES TO PHQ QUESTIONS 1-9: 0

## 2021-07-27 ASSESSMENT — ENCOUNTER SYMPTOMS
CHEST TIGHTNESS: 0
COLOR CHANGE: 0
CONSTIPATION: 0
NAUSEA: 0
DIARRHEA: 0
BLOOD IN STOOL: 0
WHEEZING: 0
EYE PAIN: 0
SINUS PRESSURE: 0
SORE THROAT: 0
EYE REDNESS: 0
ABDOMINAL PAIN: 0
COUGH: 0
VOMITING: 0
TROUBLE SWALLOWING: 0
VOICE CHANGE: 0

## 2021-07-27 ASSESSMENT — SOCIAL DETERMINANTS OF HEALTH (SDOH): HOW HARD IS IT FOR YOU TO PAY FOR THE VERY BASICS LIKE FOOD, HOUSING, MEDICAL CARE, AND HEATING?: NOT HARD AT ALL

## 2021-07-27 NOTE — PROGRESS NOTES
Chief Complaint   Patient presents with    Annual Exam     History of presenting illness:  Divine Longo is a35 y.o. female who presents today for follow up on her chronic medical conditions as noted below. Generalized anxiety disorder- lexapro seems to be doing well/ feeling better     Vitamin D deficiency- has been missing her medication     Migraine syndrome  Still issues but better on Aimovig + Botox + metoprolol + ubrevly + Imitrex PRN together + topamax daily hs  50 mg  Now follows dr Juancarlos Wagner     Patient Active Problem List    Diagnosis Date Noted    Migraine syndrome 2019    Photophobia 2019    Visual disturbance 2019    Anxiety 2019    Vitamin D deficiency 2018    Migraine without status migrainosus, not intractable 2018    Generalized anxiety disorder 2018    Sleep disturbance 2018    Other fatigue 2018     Past Medical History:   Diagnosis Date    Anxiety     Depression     Migraines       Past Surgical History:   Procedure Laterality Date     SECTION       Current Outpatient Medications   Medication Sig Dispense Refill    topiramate (TOPAMAX) 50 MG tablet Take 50 mg by mouth nightly      Ubrogepant 100 MG TABS Take 100 mg by mouth as needed      escitalopram (LEXAPRO) 10 MG tablet TAKE 1 TABLET BY MOUTH EVERY DAY 90 tablet 3    OnabotulinumtoxinA (BOTOX IJ) Inject as directed      SUMAtriptan (IMITREX) 100 MG tablet TAKE 1 TABLET BY MOUTH EVERY DAY AS NEEDED 9 tablet 11    AIMOVIG 140 MG/ML SOAJ INJECT 140 MG INTO THE SKIN EVERY 30 DAYS 1 pen 11    metoprolol succinate (TOPROL XL) 25 MG extended release tablet Take 25 mg by mouth daily      SUMAtriptan (IMITREX) 6 MG/0.5ML SOLN injection Inject 6 mg into the skin once as needed for Migraine      Cholecalciferol (VITAMIN D) 2000 units CAPS capsule Take 1 capsule by mouth daily       No current facility-administered medications for this visit.      No Known Normal rate. Heart sounds: Normal heart sounds. No murmur heard. Pulmonary:      Effort: No respiratory distress. Breath sounds: Normal breath sounds. No wheezing or rales. Chest:      Chest wall: No tenderness. Abdominal:      General: Bowel sounds are normal.      Palpations: Abdomen is soft. Musculoskeletal:      Cervical back: Neck supple. Lymphadenopathy:      Cervical: No cervical adenopathy. Skin:     General: Skin is warm. Findings: No rash. Neurological:      Mental Status: She is oriented to person, place, and time.          Lab Review   Orders Only on 07/22/2021   Component Date Value    Vit D, 25-Hydroxy 07/22/2021 36.6     TSH 07/22/2021 1.400     Color, UA 07/22/2021 YELLOW     Clarity, UA 07/22/2021 Clear     Glucose, Ur 07/22/2021 Negative     Bilirubin Urine 07/22/2021 Negative     Ketones, Urine 07/22/2021 Negative     Specific Gravity, UA 07/22/2021 1.023     Blood, Urine 07/22/2021 Negative     pH, UA 07/22/2021 6.5     Protein, UA 07/22/2021 Negative     Urobilinogen, Urine 07/22/2021 1.0     Nitrite, Urine 07/22/2021 Negative     Leukocyte Esterase, Urine 07/22/2021 Negative     Cholesterol, Total 07/22/2021 185     Triglycerides 07/22/2021 214*    HDL 07/22/2021 40*    LDL Calculated 07/22/2021 102     WBC 07/22/2021 7.1     RBC 07/22/2021 4.60     Hemoglobin 07/22/2021 14.1     Hematocrit 07/22/2021 42.1     MCV 07/22/2021 91.5     MCH 07/22/2021 30.7     MCHC 07/22/2021 33.5     RDW 07/22/2021 13.0     Platelets 33/91/7590 241     MPV 07/22/2021 10.0     Neutrophils % 07/22/2021 54.3     Lymphocytes % 07/22/2021 34.9     Monocytes % 07/22/2021 7.0     Eosinophils % 07/22/2021 2.8     Basophils % 07/22/2021 0.6     Neutrophils Absolute 07/22/2021 3.9     Immature Granulocytes # 07/22/2021 0.0     Lymphocytes Absolute 07/22/2021 2.5     Monocytes Absolute 07/22/2021 0.50     Eosinophils Absolute 07/22/2021 0.20     Basophils Absolute 07/22/2021 0.00     Sodium 07/22/2021 139     Potassium 07/22/2021 4.2     Chloride 07/22/2021 106     CO2 07/22/2021 23     Anion Gap 07/22/2021 10     Glucose 07/22/2021 85     BUN 07/22/2021 14     CREATININE 07/22/2021 0.7     GFR Non- 07/22/2021 >60     GFR  07/22/2021 >59     Calcium 07/22/2021 9.0     Total Protein 07/22/2021 7.0     Albumin 07/22/2021 4.2     Total Bilirubin 07/22/2021 0.4     Alkaline Phosphatase 07/22/2021 99     ALT 07/22/2021 16     AST 07/22/2021 17     Hemoglobin A1C 07/22/2021 4.7     Rapid HIV 1&2 07/22/2021 Non-reactive     HIV-1 P24 Ag 07/22/2021 Non-reactive            ASSESSMENT/PLAN:  Annual physical exam  * pap / pelvic per GYN  * a1c 4.7 in 7/2021  * LDL  102 in 7/2021 (109 (95)- low fat diet     Generalized anxiety disorder  Sx better on lexapro  Cont same 10 mg daily     Vitamin D deficiency  Level 36 in 7/2021 (37 (26)  Take 4000 winter months and 2000 summers    Obesity  Weight gain  Exercise about 30 min  3 times per week  Healthy, mostly fiber rich nonstarchy plant-based diet recommended  Recommend to decrease intake of processed foods, simple carbohydrates and animal-based products that high in saturated fats      Migraine syndrome  Now 3-4 migraines per month  Still issues but better on Aimovig + Botox + metoprolol + ubrevly + Imitrex PRN together + topamax daily hs  50 mg  Now follows dr Lashawn Barros This Encounter   Procedures    CBC Auto Differential    Comprehensive Metabolic Panel    Lipid Panel    Vitamin D 25 Hydroxy    Urinalysis    TSH without Reflex     New Prescriptions    No medications on file         Return in about 1 year (around 7/27/2022) for Annual Physical.   There are no Patient Instructions on file for this visit. EMR Dragon/transcription disclaimer:Significant part of this  encounter note is electronic transcription/translationof spoken language to printed text.  The electronic translation of spoken language may be erroneous, or at times, nonsensical words or phrases may be inadvertently transcribed.  Although I have reviewed the note for sucherrors, some may still exist.

## 2021-09-13 ENCOUNTER — HOSPITAL ENCOUNTER (OUTPATIENT)
Dept: PAIN MANAGEMENT | Age: 36
Discharge: HOME | End: 2021-09-13
Payer: COMMERCIAL

## 2021-09-13 VITALS
RESPIRATION RATE: 18 BRPM | TEMPERATURE: 97 F | HEART RATE: 56 BPM | SYSTOLIC BLOOD PRESSURE: 112 MMHG | DIASTOLIC BLOOD PRESSURE: 68 MMHG | OXYGEN SATURATION: 99 %

## 2021-09-13 PROCEDURE — 6360000002 HC RX W HCPCS

## 2021-09-13 PROCEDURE — 64615 CHEMODENERV MUSC MIGRAINE: CPT | Performed by: PSYCHIATRY & NEUROLOGY

## 2021-09-13 PROCEDURE — 64615 CHEMODENERV MUSC MIGRAINE: CPT

## 2021-09-13 NOTE — PROCEDURES
[address]    Patient Name: Rafa Gomez    : 1985                    Age: 31 y.o. Sex: female    Date: 2021    Pre-op Diagnosis: Chronic Migraines    Post-op Diagnosis: Chronic Migraines    Procedure: Botox injections x 155 units (45 units wasted) for migraine    Performing Procedure:  Dr Cecile Solano    Patient Vitals for the past 24 hrs:   BP Temp Temp src Pulse Resp SpO2   21 1011 112/68 97 °F (36.1 °C) Temporal 56 18 99 %       Previous Injections:  Patient had 6 migraines and 3 headaches over the past month. At least 100 units or greater of Botox was used. The patient had reduction in migraines and was able to increase their activity level post treatment with Botox    Indications:    Rafa Gomez has been treated for problems with chronic migraine headaches. The patient was taken through a conservative course of treatment without complete resolution of symptoms. Botox injection therapy was offered and after the risks and benefits of the procedure were explained to the patient, we had agreed to proceed. The patient was taken to the procedure room and placed in the seated position. The following muscles were identified using palpation and standard landmarks. These muscles were marked and prepped with alcohol. A total of 155 units were injected after careful aspiration and the following distribution bilaterally:  10 units in 2 sites, procerus 5 units in one site, frontalis 20 units in 4 sites, temporalis 40 unit in 8 sites, occipitals 30 units in 6 sites, cervical paraspinals 20 units in 4 sites, and trapezius 30 units in 6 sites. Discharge: The patient tolerated the procedure well. There were no complications during the procedure and the patient was discharged home with discharge instructions.     The patient has been instructed to contact the office should there be any complications or questions to arise between

## 2021-09-13 NOTE — PROGRESS NOTES
Patient states that he/she has __3____ headaches out of 30 days each month.   Patient states that he/she has __6____ migraines out of 30 days each month.monthly

## 2021-11-22 RX ORDER — ERENUMAB-AOOE 140 MG/ML
INJECTION, SOLUTION SUBCUTANEOUS
Qty: 1 PEN | Refills: 11 | Status: SHIPPED | OUTPATIENT
Start: 2021-11-22

## 2021-11-22 NOTE — TELEPHONE ENCOUNTER
Requested Prescriptions     Pending Prescriptions Disp Refills    AIMOVIG 140 MG/ML SOAJ [Pharmacy Med Name: AIMOVIG 140 MG/ML AUTOINJECTOR]  11     Sig: INJECT 140 MG INTO THE SKIN EVERY 30 DAYS       Last Office Visit: 08/2019  Next Office Visit: 1/5/2022  Last Medication Refill:11/30/2020

## 2022-01-21 ENCOUNTER — APPOINTMENT (OUTPATIENT)
Dept: GENERAL RADIOLOGY | Age: 37
End: 2022-01-21
Payer: OTHER MISCELLANEOUS

## 2022-01-21 ENCOUNTER — HOSPITAL ENCOUNTER (EMERGENCY)
Age: 37
Discharge: HOME | End: 2022-01-21
Attending: EMERGENCY MEDICINE
Payer: OTHER MISCELLANEOUS

## 2022-01-21 ENCOUNTER — APPOINTMENT (OUTPATIENT)
Dept: CT IMAGING | Age: 37
End: 2022-01-21
Payer: OTHER MISCELLANEOUS

## 2022-01-21 VITALS
DIASTOLIC BLOOD PRESSURE: 83 MMHG | TEMPERATURE: 98.3 F | OXYGEN SATURATION: 100 % | SYSTOLIC BLOOD PRESSURE: 117 MMHG | HEART RATE: 87 BPM | RESPIRATION RATE: 16 BRPM

## 2022-01-21 DIAGNOSIS — S93.401A SPRAIN OF RIGHT ANKLE, UNSPECIFIED LIGAMENT, INITIAL ENCOUNTER: ICD-10-CM

## 2022-01-21 DIAGNOSIS — S62.303A CLOSED FRACTURE OF THIRD METACARPAL BONE OF LEFT HAND, UNSPECIFIED FRACTURE MORPHOLOGY, INITIAL ENCOUNTER: ICD-10-CM

## 2022-01-21 DIAGNOSIS — S62.307A CLOSED FRACTURE OF FIFTH METACARPAL BONE OF LEFT HAND, UNSPECIFIED FRACTURE MORPHOLOGY, INITIAL ENCOUNTER: ICD-10-CM
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
## 2022-01-21 LAB
ALBUMIN SERPL-MCNC: 3.8 G/DL (ref 3.5–5.2)
ALP BLD-CCNC: 110 U/L (ref 35–104)
ALT SERPL-CCNC: 15 U/L (ref 5–33)
ANION GAP SERPL CALCULATED.3IONS-SCNC: 13 MMOL/L (ref 7–19)
AST SERPL-CCNC: 15 U/L (ref 5–32)
BACTERIA: ABNORMAL /HPF
BASOPHILS ABSOLUTE: 0 K/UL (ref 0–0.2)
BASOPHILS RELATIVE PERCENT: 0.3 % (ref 0–1)
BILIRUB SERPL-MCNC: 0.3 MG/DL (ref 0.2–1.2)
BILIRUBIN URINE: NEGATIVE
BLOOD, URINE: ABNORMAL
BUN BLDV-MCNC: 11 MG/DL (ref 6–20)
CALCIUM SERPL-MCNC: 7.9 MG/DL (ref 8.6–10)
CHLORIDE BLD-SCNC: 107 MMOL/L (ref 98–111)
CLARITY: CLEAR
CO2: 20 MMOL/L (ref 22–29)
COLOR: YELLOW
CREAT SERPL-MCNC: 0.5 MG/DL (ref 0.5–0.9)
CRYSTALS, UA: ABNORMAL /HPF
EOSINOPHILS ABSOLUTE: 0.2 K/UL (ref 0–0.6)
EOSINOPHILS RELATIVE PERCENT: 1.1 % (ref 0–5)
EPITHELIAL CELLS, UA: 2 /HPF (ref 0–5)
GFR AFRICAN AMERICAN: >59
GFR NON-AFRICAN AMERICAN: >60
GLUCOSE BLD-MCNC: 93 MG/DL (ref 74–109)
GLUCOSE URINE: NEGATIVE MG/DL
HCG QUALITATIVE: NEGATIVE
HCT VFR BLD CALC: 46.8 % (ref 37–47)
HEMOGLOBIN: 15.4 G/DL (ref 12–16)
HYALINE CASTS: 2 /HPF (ref 0–8)
IMMATURE GRANULOCYTES #: 0.1 K/UL
KETONES, URINE: 15 MG/DL
LEUKOCYTE ESTERASE, URINE: NEGATIVE
LYMPHOCYTES ABSOLUTE: 3.7 K/UL (ref 1.1–4.5)
LYMPHOCYTES RELATIVE PERCENT: 27.5 % (ref 20–40)
MCH RBC QN AUTO: 28.6 PG (ref 27–31)
MCHC RBC AUTO-ENTMCNC: 32.9 G/DL (ref 33–37)
MCV RBC AUTO: 87 FL (ref 81–99)
MONOCYTES ABSOLUTE: 0.9 K/UL (ref 0–0.9)
MONOCYTES RELATIVE PERCENT: 6.4 % (ref 0–10)
NEUTROPHILS ABSOLUTE: 8.5 K/UL (ref 1.5–7.5)
NEUTROPHILS RELATIVE PERCENT: 63.8 % (ref 50–65)
NITRITE, URINE: NEGATIVE
PDW BLD-RTO: 11.7 % (ref 11.5–14.5)
PH UA: 6 (ref 5–8)
PLATELET # BLD: 325 K/UL (ref 130–400)
PMV BLD AUTO: 9.1 FL (ref 9.4–12.3)
POTASSIUM REFLEX MAGNESIUM: 3.8 MMOL/L (ref 3.5–5)
PROTEIN UA: NEGATIVE MG/DL
RBC # BLD: 5.38 M/UL (ref 4.2–5.4)
RBC UA: 6 /HPF (ref 0–4)
SODIUM BLD-SCNC: 140 MMOL/L (ref 136–145)
SPECIFIC GRAVITY UA: 1.02 (ref 1–1.03)
TOTAL PROTEIN: 6.4 G/DL (ref 6.6–8.7)
UROBILINOGEN, URINE: 0.2 E.U./DL
WBC # BLD: 13.3 K/UL (ref 4.8–10.8)
WBC UA: 3 /HPF (ref 0–5)

## 2022-01-21 PROCEDURE — 36415 COLL VENOUS BLD VENIPUNCTURE: CPT

## 2022-01-21 PROCEDURE — 70450 CT HEAD/BRAIN W/O DYE: CPT

## 2022-01-21 PROCEDURE — 73610 X-RAY EXAM OF ANKLE: CPT

## 2022-01-21 PROCEDURE — 96375 TX/PRO/DX INJ NEW DRUG ADDON: CPT

## 2022-01-21 PROCEDURE — 81001 URINALYSIS AUTO W/SCOPE: CPT

## 2022-01-21 PROCEDURE — 84703 CHORIONIC GONADOTROPIN ASSAY: CPT

## 2022-01-21 PROCEDURE — 85025 COMPLETE CBC W/AUTO DIFF WBC: CPT

## 2022-01-21 PROCEDURE — 72125 CT NECK SPINE W/O DYE: CPT

## 2022-01-21 PROCEDURE — 6360000002 HC RX W HCPCS: Performed by: PHYSICIAN ASSISTANT

## 2022-01-21 PROCEDURE — 73130 X-RAY EXAM OF HAND: CPT

## 2022-01-21 PROCEDURE — 80053 COMPREHEN METABOLIC PANEL: CPT

## 2022-01-21 PROCEDURE — 96374 THER/PROPH/DIAG INJ IV PUSH: CPT

## 2022-01-21 PROCEDURE — 29125 APPL SHORT ARM SPLINT STATIC: CPT

## 2022-01-21 PROCEDURE — 99283 EMERGENCY DEPT VISIT LOW MDM: CPT

## 2022-01-21 RX ORDER — HYDROCODONE BITARTRATE AND ACETAMINOPHEN 5; 325 MG/1; MG/1
1 TABLET ORAL EVERY 6 HOURS PRN
Qty: 12 TABLET | Refills: 0 | Status: SHIPPED | OUTPATIENT
Start: 2022-01-21 | End: 2022-01-24

## 2022-01-21 RX ORDER — DEXAMETHASONE SODIUM PHOSPHATE 4 MG/ML
4 INJECTION, SOLUTION INTRA-ARTICULAR; INTRALESIONAL; INTRAMUSCULAR; INTRAVENOUS; SOFT TISSUE ONCE
Status: COMPLETED | OUTPATIENT
Start: 2022-01-21 | End: 2022-01-21

## 2022-01-21 RX ORDER — KETOROLAC TROMETHAMINE 30 MG/ML
30 INJECTION, SOLUTION INTRAMUSCULAR; INTRAVENOUS ONCE
Status: COMPLETED | OUTPATIENT
Start: 2022-01-21 | End: 2022-01-21

## 2022-01-21 RX ORDER — ORPHENADRINE CITRATE 30 MG/ML
60 INJECTION INTRAMUSCULAR; INTRAVENOUS ONCE
Status: COMPLETED | OUTPATIENT
Start: 2022-01-21 | End: 2022-01-21

## 2022-01-21 RX ADMIN — DEXAMETHASONE SODIUM PHOSPHATE 4 MG: 4 INJECTION, SOLUTION INTRAMUSCULAR; INTRAVENOUS at 13:44

## 2022-01-21 RX ADMIN — KETOROLAC TROMETHAMINE 30 MG: 30 INJECTION, SOLUTION INTRAMUSCULAR at 13:45

## 2022-01-21 RX ADMIN — ORPHENADRINE CITRATE 60 MG: 30 INJECTION INTRAMUSCULAR; INTRAVENOUS at 13:46

## 2022-01-21 ASSESSMENT — ENCOUNTER SYMPTOMS
BACK PAIN: 0
SORE THROAT: 0
PHOTOPHOBIA: 0
EYE PAIN: 0
ABDOMINAL PAIN: 0
COUGH: 0
SHORTNESS OF BREATH: 0
COLOR CHANGE: 0
EYE DISCHARGE: 0
RHINORRHEA: 0
ABDOMINAL DISTENTION: 0
APNEA: 0
NAUSEA: 0

## 2022-01-21 ASSESSMENT — PAIN SCALES - GENERAL: PAINLEVEL_OUTOF10: 5

## 2022-01-21 ASSESSMENT — PAIN DESCRIPTION - LOCATION: LOCATION: ANKLE

## 2022-01-21 NOTE — Clinical Note
Sharon Enriquez was seen and treated in our emergency department on 1/21/2022. She may return to work on 01/26/2022. If you have any questions or concerns, please don't hesitate to call.       NIKKI Lynn
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
## 2022-04-07 RX ORDER — SUMATRIPTAN 100 MG/1
TABLET, FILM COATED ORAL
Qty: 9 TABLET | Refills: 0 | Status: SHIPPED | OUTPATIENT
Start: 2022-04-07 | End: 2022-05-24

## 2022-04-07 NOTE — TELEPHONE ENCOUNTER
Requested Prescriptions     Pending Prescriptions Disp Refills    SUMAtriptan (IMITREX) 100 MG tablet [Pharmacy Med Name: SUMATRIPTAN SUCC 100 MG TABLET] 9 tablet 11     Sig: TAKE 1 TABLET BY MOUTH EVERY DAY AS NEEDED       Last Office Visit: 11/20/19  Next Office Visit: Visit date not found  Last Medication Refill: 2/17/21 with 11 refills

## 2022-05-23 NOTE — TELEPHONE ENCOUNTER
Requested Prescriptions     Pending Prescriptions Disp Refills    SUMAtriptan (IMITREX) 100 MG tablet [Pharmacy Med Name: SUMATRIPTAN SUCC 100 MG TABLET] 9 tablet 0     Sig: TAKE 1 TABLET BY MOUTH EVERY DAY AS NEEDED       Last Office Visit: Visit date not found  Next Office Visit: Visit date not found  Last Medication Refill: 4/7/2022 with 0 RF

## 2022-05-24 RX ORDER — SUMATRIPTAN 100 MG/1
TABLET, FILM COATED ORAL
Qty: 9 TABLET | Refills: 0 | Status: SHIPPED | OUTPATIENT
Start: 2022-05-24 | End: 2022-07-13 | Stop reason: SDUPTHER

## 2022-06-02 NOTE — TELEPHONE ENCOUNTER
Called and left patient a VM to let her know that I have her scheduled an appointment with Dr. Inna Olivera for an in office appointment.

## 2022-07-13 ENCOUNTER — OFFICE VISIT (OUTPATIENT)
Dept: NEUROLOGY | Age: 37
End: 2022-07-13
Payer: COMMERCIAL

## 2022-07-13 VITALS
HEART RATE: 59 BPM | BODY MASS INDEX: 30.83 KG/M2 | WEIGHT: 174 LBS | SYSTOLIC BLOOD PRESSURE: 112 MMHG | HEIGHT: 63 IN | DIASTOLIC BLOOD PRESSURE: 78 MMHG

## 2022-07-13 DIAGNOSIS — G43.909 MIGRAINE SYNDROME: Primary | ICD-10-CM

## 2022-07-13 DIAGNOSIS — E55.9 VITAMIN D DEFICIENCY: ICD-10-CM

## 2022-07-13 DIAGNOSIS — H53.9 VISUAL DISTURBANCE: ICD-10-CM

## 2022-07-13 DIAGNOSIS — H53.149 PHOTOPHOBIA: ICD-10-CM

## 2022-07-13 DIAGNOSIS — F41.9 ANXIETY: ICD-10-CM

## 2022-07-13 PROCEDURE — 99214 OFFICE O/P EST MOD 30 MIN: CPT | Performed by: PSYCHIATRY & NEUROLOGY

## 2022-07-13 RX ORDER — SUMATRIPTAN 100 MG/1
TABLET, FILM COATED ORAL
Qty: 9 TABLET | Refills: 11 | Status: SHIPPED | OUTPATIENT
Start: 2022-07-13

## 2022-07-13 RX ORDER — TOPIRAMATE 50 MG/1
100 TABLET, FILM COATED ORAL NIGHTLY
COMMUNITY

## 2022-07-13 RX ORDER — ATOGEPANT 60 MG/1
60 TABLET ORAL DAILY
Qty: 30 TABLET | Refills: 11 | Status: SHIPPED | OUTPATIENT
Start: 2022-07-13

## 2022-07-13 NOTE — PROGRESS NOTES
Chief Complaint   Patient presents with    Migraine       Jacob Yarbrough is a 31y.o. year old female who is seen for evaluation of migraines. The patient indicates that she has had migraines since her early teens related to her menstrual cycle. She indicates that over time they have worsened. They have gone from one a month to 15-20 migraine days a month. Her headache can occur at any time of day. Anxiety may trigger them. She was taken off Wellbutrin and Pristiq as these caused headache. Imitrex used to work but no longer does. The headaches are associated with photophobia, phonophobia and occasional nausea. She was placed on Topamax up to 100 mg at night without benefit with some adverse effects. Her headache is always over the left temple. There is not neck pain she does get some visual disturbance before the headache. treximet helps the most but still takes 6-8 hours. Ajovy cuts headache by half and less intense. injection site reaction last 2-3 days. currenlty 3 times a week. Maxalt or Imtirex helps a bit. Botox probably not help and stopped. On Aimovig, Topamax,Ubrelvy and Imitrex, Currently has about 5-6 headaches a month.     Active Ambulatory Problems     Diagnosis Date Noted    Vitamin D deficiency 2018    Migraine without status migrainosus, not intractable 2018    Generalized anxiety disorder 2018    Sleep disturbance 2018    Other fatigue 2018    Photophobia 2019    Visual disturbance 2019    Anxiety 2019    Migraine syndrome 2019     Resolved Ambulatory Problems     Diagnosis Date Noted    Annual physical exam 2018     Past Medical History:   Diagnosis Date    Depression     Migraines        Past Surgical History:   Procedure Laterality Date     SECTION         Family History   Problem Relation Age of Onset    Diabetes Father     Heart Disease Father     Diabetes Sister        No Known Allergies    Social History Socioeconomic History    Marital status:      Spouse name: Not on file    Number of children: 2    Years of education: Not on file    Highest education level: Not on file   Occupational History    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   Tobacco Use    Smoking status: Never Smoker    Smokeless tobacco: Never Used   Vaping Use    Vaping Use: Not on file   Substance and Sexual Activity    Alcohol use: Yes     Comment: Rarely    Drug use: No    Sexual activity: Yes     Partners: Male   Other Topics Concern    Not on file   Social History Narrative    Not on file     Social Determinants of Health     Financial Resource Strain: Low Risk     Difficulty of Paying Living Expenses: Not hard at all   Food Insecurity: No Food Insecurity    Worried About [address] [facility] in the Last Year: Never true    Travon Avalos in the Last Year: Never true   Transportation Needs:     Lack of Transportation (Medical): Not on file    Lack of Transportation (Non-Medical): Not on file   Physical Activity:     Days of Exercise per Week: Not on file    Minutes of Exercise per Session: Not on file   Stress:     Feeling of Stress : Not on file   Social Connections:     Frequency of Communication with Friends and Family: Not on file    Frequency of Social Gatherings with Friends and Family: Not on file    Attends Confucianist Services: Not on file    Active Member of [address] [place] [address] [facility] or Organizations: Not on file    Attends Club or Organization Meetings: Not on file    Marital Status: Not on file   Intimate Partner Violence:     Fear of Current or Ex-Partner: Not on file    Emotionally Abused: Not on file    Physically Abused: Not on file    Sexually Abused: Not on file   Housing Stability:     Unable to Pay for Housing in the Last Year: Not on file    Number of [place] in the Last Year: Not on file    Unstable Housing in the Last Year: Not on file     Review of Systems     Constitutional - No fever or chills. No diaphoresis or significant fatigue. HENT -  No tinnitus or significant hearing loss. Eyes - no sudden vision change or eye pain  Respiratory - no significant shortness of breath or cough  Cardiovascular - no chest pain No palpitations or significant leg swelling  Gastrointestinal - no abdominal swelling or pain. Genitourinary - No difficulty urinating, dysuria  Musculoskeletal - no back pain or myalgia. Skin - no color change or rash  Neurologic - No seizures. No lateralizing weakness. Hematologic - no easy bruising or excessive bleeding. Psychiatric - no severe anxiety or nervousness. All other review of systems are negative. Current Outpatient Medications   Medication Sig Dispense Refill    topiramate (TOPAMAX) 50 MG tablet Take 50 mg by mouth at bedtime      Atogepant (QULIPTA) 60 MG TABS Take 60 mg by mouth daily 30 tablet 11    SUMAtriptan (IMITREX) 100 MG tablet TAKE 1 TABLET BY MOUTH EVERY DAY AS NEEDED 9 tablet 11    AIMOVIG 140 MG/ML SOAJ INJECT 140 MG INTO THE SKIN EVERY 30 DAYS 1 pen 11    Ubrogepant 100 MG TABS Take 100 mg by mouth as needed      escitalopram (LEXAPRO) 10 MG tablet TAKE 1 TABLET BY MOUTH EVERY DAY 90 tablet 3    metoprolol succinate (TOPROL XL) 25 MG extended release tablet Take 25 mg by mouth daily      SUMAtriptan (IMITREX) 6 MG/0.5ML SOLN injection Inject 6 mg into the skin once as needed for Migraine      Cholecalciferol (VITAMIN D) 2000 units CAPS capsule Take 1 capsule by mouth daily      OnabotulinumtoxinA (BOTOX IJ) Inject as directed (Patient not taking: Reported on 7/13/2022)       No current facility-administered medications for this visit. /78   Pulse 59   Ht 5' 3\" (1.6 m)   Wt 174 lb (78.9 kg)   BMI 30.82 kg/m²     Constitutional - well developed, well nourished.     Eyes - conjunctiva normal.  Ear, nose, throat - No scars, masses, or lesions over external nose or ears, no atrophy of tongue  Neck-symmetric, no masses noted, no jugular vein distension  Respiration- chest wall appears symmetric, good expansion,   normal effort without use of accessory muscles  Musculoskeletal - no significant wasting of muscles noted, no bony deformities  Extremities-no clubbing, cyanosis or edema  Skin - warm, dry, and intact. No rash, erythema, or pallor. Psychiatric - mood, affect, and behavior appear normal.      Neurological exam  Awake, alert, fluent oriented  appropriate affect  Attention and concentration appear appropriate  Recent and remote memory appears unremarkable  Speech normal without dysarthria  No clear issues with language of fund of knowledge    Cranial Nerve Exam     CN III, IV,VI-EOMI, No nystagmus, conjugate eye movements, no ptosis  CN VII-no facial assymetry        Motor Exam  antigravity throughout upper and lower extremities bilaterally    Tremors- no tremors in hands or head noted    Gait  Normal base and speed  No ataxia    No results found for: [id number]  Lab Results   Component Value Date    WBC 13.3 (H) 01/21/2022    HGB 15.4 01/21/2022    HCT 46.8 01/21/2022    MCV 87.0 01/21/2022     01/21/2022     Lab Results   Component Value Date     01/21/2022    K 3.8 01/21/2022     01/21/2022    CO2 20 (L) 01/21/2022    BUN 11 01/21/2022    CREATININE 0.5 01/21/2022    GLUCOSE 93 01/21/2022    CALCIUM 7.9 (L) 01/21/2022    PROT 6.4 (L) 01/21/2022    LABALBU 3.8 01/21/2022    BILITOT 0.3 01/21/2022    ALKPHOS 110 (H) 01/21/2022    AST 15 01/21/2022    ALT 15 01/21/2022    LABGLOM >60 01/21/2022    GFRAA >59 01/21/2022          Impression   1. No abnormality is seen. Signed by Dr Emilee Alejandre on 2/6/2019 8:17 PM         Assessment    ICD-10-CM    1. Migraine syndrome  G43.909    2. Photophobia  H53.149    3. Visual disturbance  H53.9    4. Anxiety  F41.9    5. Vitamin D deficiency  E55.9        Her neurological examination today was unremarkable.  Based upon her history and examination her headaches appear

## 2022-07-29 DIAGNOSIS — E55.9 VITAMIN D DEFICIENCY: ICD-10-CM

## 2022-07-29 DIAGNOSIS — F41.1 GENERALIZED ANXIETY DISORDER: ICD-10-CM

## 2022-07-29 DIAGNOSIS — Z00.00 ANNUAL PHYSICAL EXAM: ICD-10-CM

## 2022-07-29 DIAGNOSIS — G43.909 MIGRAINE SYNDROME: ICD-10-CM

## 2022-07-29 LAB
ALBUMIN SERPL-MCNC: 4.1 G/DL (ref 3.5–5.2)
ALP BLD-CCNC: 110 U/L (ref 35–104)
ALT SERPL-CCNC: 16 U/L (ref 5–33)
ANION GAP SERPL CALCULATED.3IONS-SCNC: 8 MMOL/L (ref 7–19)
AST SERPL-CCNC: 15 U/L (ref 5–32)
BASOPHILS ABSOLUTE: 0 K/UL (ref 0–0.2)
BASOPHILS RELATIVE PERCENT: 0.5 % (ref 0–1)
BILIRUB SERPL-MCNC: 0.4 MG/DL (ref 0.2–1.2)
BUN BLDV-MCNC: 16 MG/DL (ref 6–20)
CALCIUM SERPL-MCNC: 9 MG/DL (ref 8.6–10)
CHLORIDE BLD-SCNC: 103 MMOL/L (ref 98–111)
CHOLESTEROL, TOTAL: 164 MG/DL (ref 160–199)
CO2: 26 MMOL/L (ref 22–29)
CREAT SERPL-MCNC: 0.7 MG/DL (ref 0.5–0.9)
EOSINOPHILS ABSOLUTE: 0.2 K/UL (ref 0–0.6)
EOSINOPHILS RELATIVE PERCENT: 2.4 % (ref 0–5)
GFR AFRICAN AMERICAN: >59
GFR NON-AFRICAN AMERICAN: >60
GLUCOSE BLD-MCNC: 93 MG/DL (ref 74–109)
HCT VFR BLD CALC: 43.3 % (ref 37–47)
HDLC SERPL-MCNC: 36 MG/DL (ref 65–121)
HEMOGLOBIN: 14.4 G/DL (ref 12–16)
IMMATURE GRANULOCYTES #: 0 K/UL
LDL CHOLESTEROL CALCULATED: 96 MG/DL
LYMPHOCYTES ABSOLUTE: 2.4 K/UL (ref 1.1–4.5)
LYMPHOCYTES RELATIVE PERCENT: 29.6 % (ref 20–40)
MCH RBC QN AUTO: 30.2 PG (ref 27–31)
MCHC RBC AUTO-ENTMCNC: 33.3 G/DL (ref 33–37)
MCV RBC AUTO: 90.8 FL (ref 81–99)
MONOCYTES ABSOLUTE: 0.5 K/UL (ref 0–0.9)
MONOCYTES RELATIVE PERCENT: 5.8 % (ref 0–10)
NEUTROPHILS ABSOLUTE: 4.9 K/UL (ref 1.5–7.5)
NEUTROPHILS RELATIVE PERCENT: 61.2 % (ref 50–65)
PDW BLD-RTO: 12.1 % (ref 11.5–14.5)
PLATELET # BLD: 234 K/UL (ref 130–400)
PMV BLD AUTO: 10.1 FL (ref 9.4–12.3)
POTASSIUM SERPL-SCNC: 4.2 MMOL/L (ref 3.5–5)
RBC # BLD: 4.77 M/UL (ref 4.2–5.4)
SODIUM BLD-SCNC: 137 MMOL/L (ref 136–145)
TOTAL PROTEIN: 6.7 G/DL (ref 6.6–8.7)
TRIGL SERPL-MCNC: 160 MG/DL (ref 0–149)
TSH SERPL DL<=0.05 MIU/L-ACNC: 1.86 UIU/ML (ref 0.27–4.2)
VITAMIN D 25-HYDROXY: 78.3 NG/ML
WBC # BLD: 7.9 K/UL (ref 4.8–10.8)

## 2022-08-01 ENCOUNTER — OFFICE VISIT (OUTPATIENT)
Dept: INTERNAL MEDICINE | Age: 37
End: 2022-08-01
Payer: COMMERCIAL

## 2022-08-01 VITALS
HEART RATE: 76 BPM | BODY MASS INDEX: 31.18 KG/M2 | WEIGHT: 176 LBS | HEIGHT: 63 IN | OXYGEN SATURATION: 98 % | SYSTOLIC BLOOD PRESSURE: 102 MMHG | RESPIRATION RATE: 18 BRPM | DIASTOLIC BLOOD PRESSURE: 70 MMHG

## 2022-08-01 DIAGNOSIS — E55.9 VITAMIN D DEFICIENCY: ICD-10-CM

## 2022-08-01 DIAGNOSIS — Z00.00 ANNUAL PHYSICAL EXAM: Primary | ICD-10-CM

## 2022-08-01 DIAGNOSIS — F41.1 GENERALIZED ANXIETY DISORDER: ICD-10-CM

## 2022-08-01 DIAGNOSIS — G43.909 MIGRAINE SYNDROME: ICD-10-CM

## 2022-08-01 PROCEDURE — 99395 PREV VISIT EST AGE 18-39: CPT | Performed by: INTERNAL MEDICINE

## 2022-08-01 SDOH — ECONOMIC STABILITY: FOOD INSECURITY: WITHIN THE PAST 12 MONTHS, THE FOOD YOU BOUGHT JUST DIDN'T LAST AND YOU DIDN'T HAVE MONEY TO GET MORE.: NEVER TRUE

## 2022-08-01 SDOH — ECONOMIC STABILITY: FOOD INSECURITY: WITHIN THE PAST 12 MONTHS, YOU WORRIED THAT YOUR FOOD WOULD RUN OUT BEFORE YOU GOT MONEY TO BUY MORE.: NEVER TRUE

## 2022-08-01 ASSESSMENT — PATIENT HEALTH QUESTIONNAIRE - PHQ9
SUM OF ALL RESPONSES TO PHQ QUESTIONS 1-9: 0
1. LITTLE INTEREST OR PLEASURE IN DOING THINGS: 0
SUM OF ALL RESPONSES TO PHQ QUESTIONS 1-9: 0
SUM OF ALL RESPONSES TO PHQ9 QUESTIONS 1 & 2: 0
2. FEELING DOWN, DEPRESSED OR HOPELESS: 0

## 2022-08-01 ASSESSMENT — ENCOUNTER SYMPTOMS
ABDOMINAL PAIN: 0
CHEST TIGHTNESS: 0
SORE THROAT: 0
CONSTIPATION: 0
COUGH: 0
WHEEZING: 0

## 2022-08-01 ASSESSMENT — SOCIAL DETERMINANTS OF HEALTH (SDOH): HOW HARD IS IT FOR YOU TO PAY FOR THE VERY BASICS LIKE FOOD, HOUSING, MEDICAL CARE, AND HEATING?: NOT HARD AT ALL

## 2022-08-01 NOTE — PROGRESS NOTES
Chief Complaint:   Cielo Ontiveros is a 31 y.o. female who presents forcomplete physical exam.    History of Present Illness:      Cielo Ontiveros is a 31 y.o. female who presents todayfor wellness visit AND follow up on her chronic medical conditions as noted below. Patient Active Problem List    Diagnosis Date Noted    Migraine syndrome 2019    Photophobia 2019    Visual disturbance 2019    Anxiety 2019    Vitamin D deficiency 2018    Migraine without status migrainosus, not intractable 2018    Generalized anxiety disorder 2018    Sleep disturbance 2018    Other fatigue 2018       Past Medical History:   Diagnosis Date    Anxiety     Depression     Migraines        Past Surgical History:   Procedure Laterality Date     SECTION         Current Outpatient Medications   Medication Sig Dispense Refill    topiramate (TOPAMAX) 50 MG tablet Take 100 mg by mouth at bedtime      SUMAtriptan (IMITREX) 100 MG tablet TAKE 1 TABLET BY MOUTH EVERY DAY AS NEEDED 9 tablet 11    AIMOVIG 140 MG/ML SOAJ INJECT 140 MG INTO THE SKIN EVERY 30 DAYS 1 pen 11    escitalopram (LEXAPRO) 10 MG tablet TAKE 1 TABLET BY MOUTH EVERY DAY 90 tablet 3    metoprolol succinate (TOPROL XL) 25 MG extended release tablet Take 25 mg by mouth daily      SUMAtriptan (IMITREX) 6 MG/0.5ML SOLN injection Inject 6 mg into the skin once as needed for Migraine      Cholecalciferol (VITAMIN D) 2000 units CAPS capsule Take 1 capsule by mouth daily      Atogepant (QULIPTA) 60 MG TABS Take 60 mg by mouth daily (Patient not taking: Reported on 2022) 30 tablet 11    Ubrogepant 100 MG TABS Take 100 mg by mouth as needed       No current facility-administered medications for this visit.      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   Tobacco Use    Smoking status: Never    Smokeless tobacco: Never   Substance and Sexual Activity    Alcohol use: Yes     Comment: Rarely    Drug use: No    Sexual activity: Yes     Partners: Male     Social Determinants of Health     Financial Resource Strain: Low Risk     Difficulty of Paying Living Expenses: Not hard at all   Food Insecurity: No Food Insecurity    Worried About Running Out of Food in the Last Year: Never true    Ran Out of Food in the Last Year: Never true     Family History   Problem Relation Age of Onset    Lung Cancer Mother 71        pt smoker for years    Diabetes Father     Heart Disease Father 39    Diabetes Sister 20        type 1          Past Surgical History:   Procedure Laterality Date     SECTION           Lab Review   Orders Only on 2022   Component Date Value    TSH 2022 1.860     Vit D, 25-Hydroxy 2022 78.3     Cholesterol, Total 2022 164     Triglycerides 2022 160 (A)    HDL 2022 36 (A)    LDL Calculated 2022 96     Sodium 2022 137     Potassium 2022 4.2     Chloride 2022 103     CO2 2022 26     Anion Gap 2022 8     Glucose 2022 93     BUN 2022 16     Creatinine 2022 0.7     GFR Non- 2022 >60     GFR  2022 >59     Calcium 2022 9.0     Total Protein 2022 6.7     Albumin 2022 4.1     Total Bilirubin 2022 0.4     Alkaline Phosphatase 2022 110 (A)    ALT 2022 16     AST 2022 15     WBC 2022 7.9     RBC 2022 4.77     Hemoglobin 2022 14.4     Hematocrit 2022 43.3     MCV 2022 90.8     MCH 2022 30.2     MCHC 2022 33.3     RDW 2022 12.1     Platelets  234     MPV 2022 10.1     Neutrophils % 2022 61.2     Lymphocytes % 2022 29.6     Monocytes % 2022 5.8     Eosinophils % 2022 2.4 Basophils % 07/29/2022 0.5     Neutrophils Absolute 07/29/2022 4.9     Immature Granulocytes # 07/29/2022 0.0     Lymphocytes Absolute 07/29/2022 2.4     Monocytes Absolute 07/29/2022 0.50     Eosinophils Absolute 07/29/2022 0.20     Basophils Absolute 07/29/2022 0.00          Review of Systems   Constitutional:  Positive for fatigue. Negative for chills and fever. HENT:  Negative for congestion, ear pain, nosebleeds, postnasal drip and sore throat. Respiratory:  Negative for cough, chest tightness and wheezing. Cardiovascular:  Negative for chest pain, palpitations and leg swelling. Gastrointestinal:  Negative for abdominal pain and constipation. Genitourinary:  Negative for dysuria and urgency. Musculoskeletal: Negative. Negative for arthralgias. Skin:  Negative for rash. Neurological:  Positive for headaches. Negative for dizziness. Psychiatric/Behavioral: Negative. Vitals:    08/01/22 1543   BP: 102/70   Site: Left Upper Arm   Position: Sitting   Cuff Size: Large Adult   Pulse: 76   Resp: 18   SpO2: 98%   Weight: 176 lb (79.8 kg)   Height: 5' 3\" (1.6 m)      Wt Readings from Last 3 Encounters:   08/01/22 176 lb (79.8 kg)   07/13/22 174 lb (78.9 kg)   07/27/21 174 lb (78.9 kg)   Body mass index is 31.18 kg/m². BP Readings from Last 3 Encounters:   08/01/22 102/70   07/13/22 112/78   01/21/22 117/83       Physical Exam  Constitutional:       Appearance: She is well-developed. HENT:      Right Ear: External ear normal.      Left Ear: External ear normal.      Mouth/Throat:      Pharynx: No oropharyngeal exudate. Eyes:      Conjunctiva/sclera: Conjunctivae normal.      Pupils: Pupils are equal, round, and reactive to light. Neck:      Thyroid: No thyromegaly. Vascular: No JVD. Cardiovascular:      Rate and Rhythm: Normal rate. Heart sounds: Normal heart sounds. No murmur heard. Pulmonary:      Effort: No respiratory distress. Breath sounds: Normal breath sounds. No wheezing or rales. Chest:      Chest wall: No tenderness. Abdominal:      General: Bowel sounds are normal.      Palpations: Abdomen is soft. Musculoskeletal:      Cervical back: Neck supple. Lymphadenopathy:      Cervical: No cervical adenopathy. Skin:     General: Skin is warm. Findings: No rash. ASSESSMENT/PLAN      Annual physical exam  * pap / pelvic per GYN  * a1c has been nl  * LDL  96 in 7/2022  Was 102 in 7/2021 (109 (95)- low fat diet     Generalized anxiety disorder  Sx better on lexapro  RX lexapro 10 mg daily     Vitamin D deficiency  Level 78 in 7/2022  Was 36 in 7/2021 (37 (26)  RX  Take 2000 daily     Obesity  Weight gain-no weight since summer 2021 is stable  Exercise about 30 min  3 times per week  Healthy, mostly fiber rich nonstarchy plant-based diet recommended  Recommend to decrease intake of processed foods, simple carbohydrates and animal-based products that high in saturated fats        Migraine syndrome  Now 3-4 migraines per month  Still issues but better on Aimovig + metoprolol + ubrevly + Imitrex PRN + topamax  Off botox + trileptal  Waiting for [facility] approval  Now follows dr Mary Medina This Encounter   Procedures    CBC with Auto Differential    Comprehensive Metabolic Panel    Lipid Panel    Urinalysis    TSH    Vitamin D 25 Hydroxy     New Prescriptions    No medications on file      There are no Patient Instructions on file for this visit. Return in about 1 year (around 8/1/2023) for Annual Physical.   EMR Dragon/transcription disclaimer:Significant part of this  encounter note is electronic transcription/translation of spoken language to printed text. The electronic translation of spoken language may beerroneous, or at times, nonsensical words or phrases may be inadvertently transcribed.  Although I have reviewed the note for such errors, some may still exist.

## 2022-10-05 RX ORDER — ESCITALOPRAM OXALATE 10 MG/1
TABLET ORAL
Qty: 90 TABLET | Refills: 3 | Status: SHIPPED | OUTPATIENT
Start: 2022-10-05

## 2023-02-09 ENCOUNTER — OFFICE VISIT (OUTPATIENT)
Dept: OBGYN CLINIC | Age: 38
End: 2023-02-09

## 2023-02-09 VITALS
DIASTOLIC BLOOD PRESSURE: 79 MMHG | HEIGHT: 63 IN | WEIGHT: 168 LBS | BODY MASS INDEX: 29.77 KG/M2 | SYSTOLIC BLOOD PRESSURE: 117 MMHG | HEART RATE: 59 BPM

## 2023-02-09 DIAGNOSIS — Z76.89 ENCOUNTER TO ESTABLISH CARE: ICD-10-CM

## 2023-02-09 DIAGNOSIS — Z30.431 CHECKING OF INTRAUTERINE DEVICE: ICD-10-CM

## 2023-02-09 DIAGNOSIS — Z97.5 ATTEMPTED IUD REMOVAL, UNSUCCESSFUL: ICD-10-CM

## 2023-02-09 DIAGNOSIS — Z01.419 WELL WOMAN EXAM WITH ROUTINE GYNECOLOGICAL EXAM: Primary | ICD-10-CM

## 2023-02-09 DIAGNOSIS — Z12.39 ENCOUNTER FOR SCREENING BREAST EXAMINATION: ICD-10-CM

## 2023-02-09 DIAGNOSIS — Z53.8 ATTEMPTED IUD REMOVAL, UNSUCCESSFUL: ICD-10-CM

## 2023-02-09 DIAGNOSIS — Z12.4 SCREENING FOR CERVICAL CANCER: ICD-10-CM

## 2023-02-09 DIAGNOSIS — Z11.51 SCREENING FOR HPV (HUMAN PAPILLOMAVIRUS): ICD-10-CM

## 2023-02-09 LAB — HPV COMMENT: NORMAL

## 2023-02-09 NOTE — PROGRESS NOTES
[facility]  CNM Office Note    Edvin Kellogg is a 30 y.o. female who presents today for her medical conditions/ complaints as noted below. Chief Complaint   Patient presents with    New Patient     She has never seen Milli Shay before. Establish Care    Gynecologic Exam     Patient presents today for a pap smear and breast exam.     Contraception     Has an IUD and struggling with it and would like to discuss. She has had it in for almost 7 years. HPI  Pt presents today for pap smear and breast exam and to establish care. She also complains of issues with IUD. In the last 5 months has started have irregular periods (some heavy some light) with intermittent cramping. Prior to issue did not have any periods while on IUD. Last mammogram: never  Last pap smear: 2020 @ [facility], negative pap and HPV  Sexually active: Yes  Sexual preference: Male  Contraception: IUD    : 2  Para: 2  AB: 0  Last bone density: never   Last colonoscopy: never  Menarche: 17years old  LMP: 2023 approx. Menses: really sparatic, lately they've been somewhat more consistent. Patient Active Problem List   Diagnosis    Vitamin D deficiency    Migraine without status migrainosus, not intractable    Generalized anxiety disorder    Sleep disturbance    Other fatigue    Photophobia    Visual disturbance    Anxiety    Migraine syndrome       Patient's last menstrual period was 2023 (approximate).   [id number]    Past Medical History:   Diagnosis Date    Anxiety     Depression     Migraines      Past Surgical History:   Procedure Laterality Date     SECTION       Family History   Problem Relation Age of Onset    Lung Cancer Mother 71        pt smoker for years    Diabetes Father     Heart Disease Father 39    Diabetes Sister 21        type 1     Social History     Tobacco Use    Smoking status: Never    Smokeless tobacco: Never   Substance Use Topics    Alcohol use: Yes     Comment: Rarely       Current Outpatient Medications   Medication Sig Dispense Refill    escitalopram (LEXAPRO) 10 MG tablet TAKE 1 TABLET BY MOUTH EVERY DAY 90 tablet 3    SUMAtriptan (IMITREX) 100 MG tablet TAKE 1 TABLET BY MOUTH EVERY DAY AS NEEDED 9 tablet 11    AIMOVIG 140 MG/ML SOAJ INJECT 140 MG INTO THE SKIN EVERY 30 DAYS 1 pen 11    Ubrogepant 100 MG TABS Take 100 mg by mouth as needed      metoprolol succinate (TOPROL XL) 25 MG extended release tablet Take 25 mg by mouth daily      SUMAtriptan (IMITREX) 6 MG/0.5ML SOLN injection Inject 6 mg into the skin once as needed for Migraine      Cholecalciferol (VITAMIN D) 2000 units CAPS capsule Take 1 capsule by mouth daily       No current facility-administered medications for this visit. No Known Allergies  Vitals:    02/09/23 1535   BP: 117/79   Pulse: 59     Body mass index is 29.76 kg/m². Review of Systems   Constitutional: Negative. HENT: Negative. Eyes: Negative. Respiratory: Negative. Cardiovascular: Negative. Gastrointestinal: Negative. Endocrine: Negative. Genitourinary:  Positive for menstrual problem and pelvic pain. Negative for dyspareunia, dysuria, flank pain, vaginal bleeding, vaginal discharge and vaginal pain. Musculoskeletal: Negative. Skin: Negative. Allergic/Immunologic: Negative. Neurological: Negative. Hematological: Negative. Psychiatric/Behavioral: Negative. Physical Exam  Vitals and nursing note reviewed. Constitutional:       Appearance: She is well-developed. HENT:      Head: Normocephalic and atraumatic. Eyes:      Conjunctiva/sclera: Conjunctivae normal.      Pupils: Pupils are equal, round, and reactive to light. Neck:      Thyroid: No thyromegaly. Trachea: No tracheal deviation. Cardiovascular:      Rate and Rhythm: Normal rate and regular rhythm. Heart sounds: Normal heart sounds. No murmur heard.   Pulmonary:      Effort: Pulmonary effort is normal. No respiratory distress. Breath sounds: Normal breath sounds. Chest:      Comments: Breasts symmetrical without tenderness, masses, or nipple discharge. Nipples everted bilaterally. Abdominal:      General: There is no distension. Palpations: Abdomen is soft. Tenderness: There is no abdominal tenderness. There is no guarding. Genitourinary:     General: Normal vulva. Exam position: Lithotomy position. Labia:         Right: No rash or lesion. Left: No rash or lesion. Vagina: Normal. No erythema or lesions. Cervix: Normal.      Uterus: Normal. Not tender. Adnexa: Right adnexa normal and left adnexa normal.        Right: No mass, tenderness or fullness. Left: No mass, tenderness or fullness. Musculoskeletal:         General: Normal range of motion. Cervical back: Normal range of motion and neck supple. Skin:     General: Skin is warm and dry. Capillary Refill: Capillary refill takes less than 2 seconds. Neurological:      Mental Status: She is alert and oriented to person, place, and time. Psychiatric:         Behavior: Behavior normal.         Thought Content: Thought content normal.         Judgment: Judgment normal.   IUD Removal Procedure Note    IUD strings not visualized at cervical os. Failed attempt to grasp strings for removal and US ordered for placement. Pt tolerated procedure well. Alternative form of contraception and/or family planning discussed and all questions answered. Diagnosis Orders   1. Well woman exam with routine gynecological exam  PAP SMEAR      2. Encounter to establish care        3. Screening for cervical cancer  PAP SMEAR      4. Encounter for screening breast examination  PAP SMEAR    Human papillomavirus (HPV) DNA probe thin prep high risk      5. Screening for HPV (human papillomavirus)  Human papillomavirus (HPV) DNA probe thin prep high risk      6.  Checking of intrauterine device  US NON OB TRANSVAGINAL 7. Attempted IUD removal, unsuccessful            MEDICATIONS:  No orders of the defined types were placed in this encounter. ORDERS:  Orders Placed This Encounter   Procedures    US NON OB TRANSVAGINAL    PAP SMEAR    Human papillomavirus (HPV) DNA probe thin prep high risk       PLAN:  WWE- pap collected, SBE reviewed and CBE performed. IUD- failed at attempted removal.  US ordered and will f/u for removal and replacement pending US results.

## 2023-02-09 NOTE — PATIENT INSTRUCTIONS
Patient Education        Well Visit, Ages 25 to 72: Care Instructions  Well visits can help you stay healthy. Your doctor has checked your overall health and may have suggested ways to take good care of yourself. Your doctor also may have recommended tests. You can help prevent illness with healthy eating, good sleep, vaccinations, regular exercise, and other steps. Get the tests that you and your doctor decide on. Depending on your age and risks, examples might include screening for diabetes; hepatitis C; HIV; and cervical, breast, lung, and colon cancer. Screening helps find diseases before any symptoms appear. Eat healthy foods. Choose fruits, vegetables, whole grains, lean protein, and low-fat dairy foods. Limit saturated fat and reduce salt. Limit alcohol. Men should have no more than 2 drinks a day. Women should have no more than 1. For some people, no alcohol is the best choice. Exercise. Get at least 30 minutes of exercise on most days of the week. Walking can be a good choice. Reach and stay at your healthy weight. This will lower your risk for many health problems. Take care of your mental health. Try to stay connected with friends, family, and community, and find ways to manage stress. If you're feeling depressed or hopeless, talk to someone. A counselor can help. If you don't have a counselor, talk to your doctor. Talk to your doctor if you think you may have a problem with alcohol or drug use. This includes prescription medicines and illegal drugs. Avoid tobacco and nicotine: Don't smoke, vape, or chew. If you need help quitting, talk to your doctor. Practice safer sex. Getting tested, using condoms or dental dams, and limiting sex partners can help prevent STIs. Use birth control if it's important to you to prevent pregnancy. Talk with your doctor about your choices and what might be best for you. Prevent problems where you can.  Protect your skin from too much sun, wash your hands, brush your teeth twice a day, and wear a seat belt in the car. Where can you learn more? Go to http://www.tom.com/ and enter P072 to learn more about \"Well Visit, Ages 25 to 72: Care Instructions. \"  Current as of: March 9, 2022               Content Version: 13.5  © 2006-2022 [employer]. Care instructions adapted under license by [facility] [place] ([facility]). If you have questions about a medical condition or this instruction, always ask your healthcare professional. [place] [address] any warranty or liability for your use of this information. Patient Education        Breast Self-Exam: Care Instructions  Your Care Instructions     A breast self-exam is when you check your breasts for lumps or changes. This regular exam helps you learn how your breasts normally look and feel. Most breast problems or changes are not because of cancer. Breast self-exam is not a substitute for a mammogram. Having regular breast exams by your doctor and regular mammograms improve your chances of finding any problems with your breasts. Some women set a time each month to do a step-by-step breast self-exam. Other women like a less formal system. They might look at their breasts as they brush their teeth, or feel their breasts once in a while in the shower. If you notice a change in your breast, tell your doctor. Follow-up care is a key part of your treatment and safety. Be sure to make and go to all appointments, and call your doctor if you are having problems. It's also a good idea to know your test results and keep a list of the medicines you take. How do you do a breast self-exam?  The best time to examine your breasts is usually one week after your menstrual period begins. Your breasts should not be tender then. If you do not have periods, you might do your exam on a day of the month that is easy to remember.   To examine your breasts:  Remove all your clothes above the waist and lie down. When you are lying down, your breast tissue spreads evenly over your chest wall, which makes it easier to feel all your breast tissue. Use the pads--not the fingertips--of the 3 middle fingers of your left hand to check your right breast. Move your fingers slowly in small coin-sized circles that overlap. Use three levels of pressure to feel of all your breast tissue. Use light pressure to feel the tissue close to the skin surface. Use medium pressure to feel a little deeper. Use firm pressure to feel your tissue close to your breastbone and ribs. Use each pressure level to feel your breast tissue before moving on to the next spot. Check your entire breast, moving up and down as if following a strip from the collarbone to the bra line, and from the armpit to the ribs. Repeat until you have covered the entire breast.  Repeat this procedure for your left breast, using the pads of the 3 middle fingers of your right hand. To examine your breasts while in the shower:  Place one arm over your head and lightly soap your breast on that side. Using the pads of your fingers, gently move your hand over your breast (in the strip pattern described above), feeling carefully for any lumps or changes. Repeat for the other breast.  Have your doctor inspect anything you notice to see if you need further testing. Where can you learn more? Go to http://www.woods.com/ and enter P148 to learn more about \"Breast Self-Exam: Care Instructions. \"  Current as of: August 2, 2022               Content Version: 13.5  © 2006-2022 [employer]. Care instructions adapted under license by [facility] ([facility]). If you have questions about a medical condition or this instruction, always ask your healthcare professional. [place] [address] any warranty or liability for your use of this information.

## 2023-02-10 ASSESSMENT — ENCOUNTER SYMPTOMS
RESPIRATORY NEGATIVE: 1
ALLERGIC/IMMUNOLOGIC NEGATIVE: 1
EYES NEGATIVE: 1
GASTROINTESTINAL NEGATIVE: 1

## 2023-02-20 ENCOUNTER — HOSPITAL ENCOUNTER (OUTPATIENT)
Dept: ULTRASOUND IMAGING | Age: 38
Discharge: HOME | End: 2023-02-20
Payer: COMMERCIAL

## 2023-02-20 DIAGNOSIS — Z30.431 CHECKING OF INTRAUTERINE DEVICE: ICD-10-CM

## 2023-02-20 PROCEDURE — 76830 TRANSVAGINAL US NON-OB: CPT | Performed by: RADIOLOGY

## 2023-02-20 PROCEDURE — 76830 TRANSVAGINAL US NON-OB: CPT

## 2023-03-27 RX ORDER — MISOPROSTOL 200 UG/1
200 TABLET ORAL ONCE
Qty: 1 TABLET | Refills: 0 | Status: SHIPPED | OUTPATIENT
Start: 2023-03-27 | End: 2023-03-27

## 2023-05-12 ENCOUNTER — PATIENT MESSAGE (OUTPATIENT)
Dept: INTERNAL MEDICINE | Age: 38
End: 2023-05-12

## 2023-05-12 RX ORDER — ESCITALOPRAM OXALATE 10 MG/1
15 TABLET ORAL DAILY
Qty: 45 TABLET | Refills: 1 | Status: SHIPPED | OUTPATIENT
Start: 2023-05-12

## 2023-05-12 NOTE — TELEPHONE ENCOUNTER
I spoke with pt. And it is more anxiety than depression.   She will try the 15mg and call back in a couple of weeks if not helping

## 2023-05-12 NOTE — TELEPHONE ENCOUNTER
From: Chayo Huff  To: Dr. Alex Mckeon  Sent: 5/12/2023 7:48 AM CDT  Subject: Increase in dose    Hey Dr. Alcira Aguilar. Last year at my physical, you mentioned to let you know if I was having a hard time and needed an increase in my anxiety medication. I have had some major life changes lately and Im just really struggling handling everything. My anxiety has gotten much worse! I was hoping we could slightly increase my dosage to see if that helps. Let me know if that would be possible. Thank you!

## 2023-07-17 RX ORDER — ESCITALOPRAM OXALATE 10 MG/1
15 TABLET ORAL DAILY
Qty: 135 TABLET | Refills: 0 | Status: SHIPPED | OUTPATIENT
Start: 2023-07-17

## 2023-07-17 NOTE — TELEPHONE ENCOUNTER
Jelani Childers called requesting a refill of the below medication which has been pended for you:     Requested Prescriptions     Pending Prescriptions Disp Refills    escitalopram (LEXAPRO) 10 MG tablet 45 tablet 1     Sig: Take 1.5 tablets by mouth daily       Last Appointment Date: 8/1/2022  Next Appointment Date: 8/1/2023    No Known Allergies

## 2023-08-24 DIAGNOSIS — Z00.00 ANNUAL PHYSICAL EXAM: ICD-10-CM

## 2023-08-24 DIAGNOSIS — G43.909 MIGRAINE SYNDROME: ICD-10-CM

## 2023-08-24 DIAGNOSIS — F41.1 GENERALIZED ANXIETY DISORDER: ICD-10-CM

## 2023-08-24 DIAGNOSIS — E55.9 VITAMIN D DEFICIENCY: ICD-10-CM

## 2023-08-24 LAB
25(OH)D3 SERPL-MCNC: 67.7 NG/ML
ALBUMIN SERPL-MCNC: 4.5 G/DL (ref 3.5–5.2)
ALP SERPL-CCNC: 95 U/L (ref 35–104)
ALT SERPL-CCNC: 15 U/L (ref 5–33)
ANION GAP SERPL CALCULATED.3IONS-SCNC: 13 MMOL/L (ref 7–19)
AST SERPL-CCNC: 16 U/L (ref 5–32)
BASOPHILS # BLD: 0 K/UL (ref 0–0.2)
BASOPHILS NFR BLD: 0.5 % (ref 0–1)
BILIRUB SERPL-MCNC: 0.4 MG/DL (ref 0.2–1.2)
BILIRUB UR QL STRIP: NEGATIVE
BUN SERPL-MCNC: 17 MG/DL (ref 6–20)
CALCIUM SERPL-MCNC: 9 MG/DL (ref 8.6–10)
CHLORIDE SERPL-SCNC: 105 MMOL/L (ref 98–111)
CHOLEST SERPL-MCNC: 184 MG/DL (ref 160–199)
CLARITY UR: CLEAR
CO2 SERPL-SCNC: 23 MMOL/L (ref 22–29)
COLOR UR: YELLOW
CREAT SERPL-MCNC: 0.8 MG/DL (ref 0.5–0.9)
EOSINOPHIL # BLD: 0.2 K/UL (ref 0–0.6)
EOSINOPHIL NFR BLD: 2.8 % (ref 0–5)
ERYTHROCYTE [DISTWIDTH] IN BLOOD BY AUTOMATED COUNT: 12.1 % (ref 11.5–14.5)
GLUCOSE SERPL-MCNC: 91 MG/DL (ref 74–109)
GLUCOSE UR STRIP.AUTO-MCNC: NEGATIVE MG/DL
HCT VFR BLD AUTO: 41.7 % (ref 37–47)
HDLC SERPL-MCNC: 42 MG/DL (ref 65–121)
HGB BLD-MCNC: 14.1 G/DL (ref 12–16)
HGB UR STRIP.AUTO-MCNC: NEGATIVE MG/L
IMM GRANULOCYTES # BLD: 0 K/UL
KETONES UR STRIP.AUTO-MCNC: NEGATIVE MG/DL
LDLC SERPL CALC-MCNC: 120 MG/DL
LEUKOCYTE ESTERASE UR QL STRIP.AUTO: NEGATIVE
LYMPHOCYTES # BLD: 2.6 K/UL (ref 1.1–4.5)
LYMPHOCYTES NFR BLD: 34.8 % (ref 20–40)
MCH RBC QN AUTO: 30.7 PG (ref 27–31)
MCHC RBC AUTO-ENTMCNC: 33.8 G/DL (ref 33–37)
MCV RBC AUTO: 90.8 FL (ref 81–99)
MONOCYTES # BLD: 0.5 K/UL (ref 0–0.9)
MONOCYTES NFR BLD: 6.2 % (ref 0–10)
NEUTROPHILS # BLD: 4.1 K/UL (ref 1.5–7.5)
NEUTS SEG NFR BLD: 55.3 % (ref 50–65)
NITRITE UR QL STRIP.AUTO: NEGATIVE
PH UR STRIP.AUTO: 6 [PH] (ref 5–8)
PLATELET # BLD AUTO: 247 K/UL (ref 130–400)
PMV BLD AUTO: 10.6 FL (ref 9.4–12.3)
POTASSIUM SERPL-SCNC: 4 MMOL/L (ref 3.5–5)
PROT SERPL-MCNC: 7.2 G/DL (ref 6.6–8.7)
PROT UR STRIP.AUTO-MCNC: NEGATIVE MG/DL
RBC # BLD AUTO: 4.59 M/UL (ref 4.2–5.4)
SODIUM SERPL-SCNC: 141 MMOL/L (ref 136–145)
SP GR UR STRIP.AUTO: 1.02 (ref 1–1.03)
TRIGL SERPL-MCNC: 109 MG/DL (ref 0–149)
TSH SERPL DL<=0.005 MIU/L-ACNC: 1.63 UIU/ML (ref 0.27–4.2)
UROBILINOGEN UR STRIP.AUTO-MCNC: 0.2 E.U./DL
WBC # BLD AUTO: 7.4 K/UL (ref 4.8–10.8)

## 2023-09-19 RX ORDER — ESCITALOPRAM OXALATE 10 MG/1
TABLET ORAL
Qty: 135 TABLET | Refills: 0 | Status: SHIPPED | OUTPATIENT
Start: 2023-09-19

## 2023-09-19 NOTE — TELEPHONE ENCOUNTER
Sabi Stanislaw called to request a refill on her medication.       Last office visit : 8/1/2022   Next office visit : 9/29/2023     Requested Prescriptions     Pending Prescriptions Disp Refills    escitalopram (LEXAPRO) 10 MG tablet [Pharmacy Med Name: ESCITALOPRAM 10 MG TABLET] 135 tablet 0     Sig: TAKE 1 AND 1/2 TABLETS DAILY BY MOUTH            Lawanda Campos MA

## 2023-09-26 ASSESSMENT — PATIENT HEALTH QUESTIONNAIRE - PHQ9
SUM OF ALL RESPONSES TO PHQ9 QUESTIONS 1 & 2: 0
1. LITTLE INTEREST OR PLEASURE IN DOING THINGS: 0
SUM OF ALL RESPONSES TO PHQ QUESTIONS 1-9: 0
2. FEELING DOWN, DEPRESSED OR HOPELESS: 0
SUM OF ALL RESPONSES TO PHQ QUESTIONS 1-9: 0
SUM OF ALL RESPONSES TO PHQ QUESTIONS 1-9: 0
2. FEELING DOWN, DEPRESSED OR HOPELESS: NOT AT ALL
SUM OF ALL RESPONSES TO PHQ QUESTIONS 1-9: 0
SUM OF ALL RESPONSES TO PHQ9 QUESTIONS 1 & 2: 0
1. LITTLE INTEREST OR PLEASURE IN DOING THINGS: NOT AT ALL

## 2023-09-29 ENCOUNTER — OFFICE VISIT (OUTPATIENT)
Dept: INTERNAL MEDICINE | Age: 38
End: 2023-09-29
Payer: COMMERCIAL

## 2023-09-29 VITALS
OXYGEN SATURATION: 99 % | BODY MASS INDEX: 31.43 KG/M2 | DIASTOLIC BLOOD PRESSURE: 80 MMHG | HEIGHT: 63 IN | SYSTOLIC BLOOD PRESSURE: 112 MMHG | HEART RATE: 60 BPM | WEIGHT: 177.4 LBS

## 2023-09-29 DIAGNOSIS — G43.909 MIGRAINE SYNDROME: ICD-10-CM

## 2023-09-29 DIAGNOSIS — F41.1 GENERALIZED ANXIETY DISORDER: ICD-10-CM

## 2023-09-29 DIAGNOSIS — E55.9 VITAMIN D DEFICIENCY: ICD-10-CM

## 2023-09-29 DIAGNOSIS — Z00.00 ANNUAL PHYSICAL EXAM: Primary | ICD-10-CM

## 2023-09-29 PROCEDURE — 99395 PREV VISIT EST AGE 18-39: CPT | Performed by: INTERNAL MEDICINE

## 2023-09-29 RX ORDER — ESCITALOPRAM OXALATE 10 MG/1
TABLET ORAL
Qty: 135 TABLET | Refills: 3 | Status: SHIPPED | OUTPATIENT
Start: 2023-09-29

## 2023-09-29 ASSESSMENT — ENCOUNTER SYMPTOMS
CHEST TIGHTNESS: 0
COUGH: 0
SORE THROAT: 0
ABDOMINAL PAIN: 0
CONSTIPATION: 0
WHEEZING: 0

## 2023-10-18 RX ORDER — ESCITALOPRAM OXALATE 10 MG/1
TABLET ORAL
Qty: 90 TABLET | Refills: 3 | Status: SHIPPED | OUTPATIENT
Start: 2023-10-18

## 2023-10-18 NOTE — TELEPHONE ENCOUNTER
Juan Ramirez called requesting a refill of the below medication which has been pended for you:     Requested Prescriptions     Pending Prescriptions Disp Refills    escitalopram (LEXAPRO) 10 MG tablet [Pharmacy Med Name: ESCITALOPRAM 10 MG TABLET] 90 tablet 1     Sig: TAKE 1 TABLET BY MOUTH EVERY DAY       Last Appointment Date: Visit date not found  Next Appointment Date: 9/26/2024    No Known Allergies

## 2023-11-28 SDOH — ECONOMIC STABILITY: FOOD INSECURITY: WITHIN THE PAST 12 MONTHS, THE FOOD YOU BOUGHT JUST DIDN'T LAST AND YOU DIDN'T HAVE MONEY TO GET MORE.: NEVER TRUE

## 2023-11-28 SDOH — ECONOMIC STABILITY: HOUSING INSECURITY
IN THE LAST 12 MONTHS, WAS THERE A TIME WHEN YOU DID NOT HAVE A STEADY PLACE TO SLEEP OR SLEPT IN A SHELTER (INCLUDING NOW)?: NO

## 2023-11-28 SDOH — ECONOMIC STABILITY: INCOME INSECURITY: HOW HARD IS IT FOR YOU TO PAY FOR THE VERY BASICS LIKE FOOD, HOUSING, MEDICAL CARE, AND HEATING?: NOT HARD AT ALL

## 2023-11-28 SDOH — ECONOMIC STABILITY: FOOD INSECURITY: WITHIN THE PAST 12 MONTHS, YOU WORRIED THAT YOUR FOOD WOULD RUN OUT BEFORE YOU GOT MONEY TO BUY MORE.: NEVER TRUE

## 2023-11-28 SDOH — ECONOMIC STABILITY: TRANSPORTATION INSECURITY
IN THE PAST 12 MONTHS, HAS LACK OF TRANSPORTATION KEPT YOU FROM MEETINGS, WORK, OR FROM GETTING THINGS NEEDED FOR DAILY LIVING?: NO

## 2023-11-30 ENCOUNTER — PATIENT MESSAGE (OUTPATIENT)
Dept: INTERNAL MEDICINE | Age: 38
End: 2023-11-30

## 2023-11-30 ENCOUNTER — OFFICE VISIT (OUTPATIENT)
Dept: INTERNAL MEDICINE | Age: 38
End: 2023-11-30
Payer: COMMERCIAL

## 2023-11-30 VITALS
SYSTOLIC BLOOD PRESSURE: 108 MMHG | WEIGHT: 181.4 LBS | OXYGEN SATURATION: 98 % | BODY MASS INDEX: 32.14 KG/M2 | DIASTOLIC BLOOD PRESSURE: 70 MMHG | HEIGHT: 63 IN | HEART RATE: 64 BPM

## 2023-11-30 DIAGNOSIS — E66.09 EXOGENOUS OBESITY: Primary | ICD-10-CM

## 2023-11-30 DIAGNOSIS — R63.5 WEIGHT GAIN: ICD-10-CM

## 2023-11-30 PROCEDURE — 99213 OFFICE O/P EST LOW 20 MIN: CPT | Performed by: INTERNAL MEDICINE

## 2023-11-30 ASSESSMENT — ENCOUNTER SYMPTOMS
COUGH: 0
SORE THROAT: 0
ABDOMINAL PAIN: 0
CONSTIPATION: 0
WHEEZING: 0
CHEST TIGHTNESS: 0

## 2023-11-30 NOTE — PROGRESS NOTES
Chief Complaint   Patient presents with    Other     Discuss weight loss      History of presenting illness:  Alisha Dunne is a42 y.o. female who presents today for follow up on her chronic medical conditions as noted below. Patient Active Problem List    Diagnosis Date Noted    Migraine syndrome 2019    Photophobia 2019    Visual disturbance 2019    Anxiety 2019    Vitamin D deficiency 2018    Migraine without status migrainosus, not intractable 2018    Generalized anxiety disorder 2018    Sleep disturbance 2018    Other fatigue 2018     Past Medical History:   Diagnosis Date    Anxiety     Depression     Migraines       Past Surgical History:   Procedure Laterality Date     SECTION       Current Outpatient Medications   Medication Sig Dispense Refill    Semaglutide-Weight Management (WEGOVY) 0.25 MG/0.5ML SOAJ SC injection Inject 0.25 mg into the skin every 7 days 2 mL 0    escitalopram (LEXAPRO) 10 MG tablet TAKE 1 TABLET BY MOUTH EVERY DAY 90 tablet 3    MIRENA, 52 MG, IUD 52 mg       SUMAtriptan (IMITREX) 100 MG tablet TAKE 1 TABLET BY MOUTH EVERY DAY AS NEEDED 9 tablet 11    AIMOVIG 140 MG/ML SOAJ INJECT 140 MG INTO THE SKIN EVERY 30 DAYS 1 pen 11    Ubrogepant 100 MG TABS Take 100 mg by mouth as needed      metoprolol succinate (TOPROL XL) 25 MG extended release tablet Take 1 tablet by mouth daily      SUMAtriptan (IMITREX) 6 MG/0.5ML SOLN injection Inject 0.5 mLs into the skin once as needed for Migraine      Cholecalciferol (VITAMIN D) 2000 units CAPS capsule Take 1 capsule by mouth daily       No current facility-administered medications for this visit.      No Known Allergies  Social History     Tobacco Use    Smoking status: Never    Smokeless tobacco: Never   Substance Use Topics    Alcohol use: Yes     Comment: Rarely      Family History   Problem Relation Age of Onset    Lung Cancer Mother 71        pt smoker for years    Diabetes

## 2023-11-30 NOTE — TELEPHONE ENCOUNTER
From: Dre Hensley  To: Dr. Hill Going  Sent: 11/30/2023 10:08 AM CST  Subject: Emly Guy! Insurance is requesting Prior Authorization for the [place]. Could you please send that to the insurance on file so the prescription will be covered? Thank you!

## 2023-11-30 NOTE — TELEPHONE ENCOUNTER
Missael Srinivasa called to request a refill on her medication.       Last office visit : 11/30/2023   Next office visit : Visit date not found     Requested Prescriptions     Pending Prescriptions Disp Refills    Tirzepatide (MOUNJARO) 2.5 MG/0.5ML SOPN SC injection 2 mL 1     Sig: Inject 0.5 mLs into the skin once a week            Gayle Odell MA

## 2024-02-14 ENCOUNTER — OFFICE VISIT (OUTPATIENT)
Dept: OBGYN CLINIC | Age: 39
End: 2024-02-14
Payer: COMMERCIAL

## 2024-02-14 VITALS
DIASTOLIC BLOOD PRESSURE: 86 MMHG | WEIGHT: 182 LBS | SYSTOLIC BLOOD PRESSURE: 121 MMHG | HEIGHT: 63 IN | BODY MASS INDEX: 32.25 KG/M2

## 2024-02-14 DIAGNOSIS — Z12.4 SCREENING FOR CERVICAL CANCER: ICD-10-CM

## 2024-02-14 DIAGNOSIS — Z12.39 ENCOUNTER FOR SCREENING BREAST EXAMINATION: ICD-10-CM

## 2024-02-14 DIAGNOSIS — Z01.419 WELL WOMAN EXAM WITH ROUTINE GYNECOLOGICAL EXAM: Primary | ICD-10-CM

## 2024-02-14 DIAGNOSIS — Z30.431 INTRAUTERINE DEVICE SURVEILLANCE: ICD-10-CM

## 2024-02-14 PROCEDURE — 99395 PREV VISIT EST AGE 18-39: CPT | Performed by: NURSE PRACTITIONER

## 2024-02-14 NOTE — PROGRESS NOTES
(IMITREX) 6 MG/0.5ML SOLN injection Inject 0.5 mLs into the skin once as needed for Migraine      Cholecalciferol (VITAMIN D) 2000 units CAPS capsule Take 1 capsule by mouth daily       No current facility-administered medications for this visit.     No Known Allergies  Vitals:    02/14/24 0838   BP: 121/86     Body mass index is 32.24 kg/m².    Review of Systems   Constitutional: Negative.    HENT: Negative.     Eyes: Negative.    Respiratory: Negative.     Cardiovascular: Negative.    Gastrointestinal: Negative.    Endocrine: Negative.    Genitourinary: Negative.  Negative for dyspareunia, dysuria, flank pain, menstrual problem, pelvic pain, vaginal bleeding, vaginal discharge and vaginal pain.   Musculoskeletal: Negative.    Skin: Negative.    Allergic/Immunologic: Negative.    Neurological: Negative.    Hematological: Negative.    Psychiatric/Behavioral: Negative.         Physical Exam  Vitals and nursing note reviewed.   Constitutional:       Appearance: She is well-developed.   HENT:      Head: Normocephalic and atraumatic.   Eyes:      Conjunctiva/sclera: Conjunctivae normal.      Pupils: Pupils are equal, round, and reactive to light.   Neck:      Thyroid: No thyromegaly.      Trachea: No tracheal deviation.   Cardiovascular:      Rate and Rhythm: Normal rate and regular rhythm.      Heart sounds: Normal heart sounds. No murmur heard.  Pulmonary:      Effort: Pulmonary effort is normal. No respiratory distress.      Breath sounds: Normal breath sounds.   Chest:      Comments: Breasts symmetrical without tenderness, masses, or nipple discharge. Nipples everted bilaterally.    Abdominal:      General: There is no distension.      Palpations: Abdomen is soft.      Tenderness: There is no abdominal tenderness. There is no guarding.   Genitourinary:     General: Normal vulva.      Exam position: Lithotomy position.      Labia:         Right: No rash or lesion.         Left: No rash or lesion.       Vagina:

## 2024-02-20 LAB
HPV HR 12 DNA SPEC QL NAA+PROBE: NOT DETECTED
HPV16 DNA SPEC QL NAA+PROBE: NOT DETECTED
HPV16+18+H RISK 12 DNA SPEC-IMP: NORMAL
HPV18 DNA SPEC QL NAA+PROBE: NOT DETECTED

## 2024-02-26 ASSESSMENT — ENCOUNTER SYMPTOMS
ALLERGIC/IMMUNOLOGIC NEGATIVE: 1
RESPIRATORY NEGATIVE: 1
EYES NEGATIVE: 1
GASTROINTESTINAL NEGATIVE: 1

## 2024-07-22 ASSESSMENT — PATIENT HEALTH QUESTIONNAIRE - PHQ9
1. LITTLE INTEREST OR PLEASURE IN DOING THINGS: NOT AT ALL
SUM OF ALL RESPONSES TO PHQ9 QUESTIONS 1 & 2: 0
SUM OF ALL RESPONSES TO PHQ QUESTIONS 1-9: 0
2. FEELING DOWN, DEPRESSED OR HOPELESS: NOT AT ALL
SUM OF ALL RESPONSES TO PHQ QUESTIONS 1-9: 0
2. FEELING DOWN, DEPRESSED OR HOPELESS: NOT AT ALL
SUM OF ALL RESPONSES TO PHQ QUESTIONS 1-9: 0
SUM OF ALL RESPONSES TO PHQ9 QUESTIONS 1 & 2: 0
SUM OF ALL RESPONSES TO PHQ QUESTIONS 1-9: 0
1. LITTLE INTEREST OR PLEASURE IN DOING THINGS: NOT AT ALL

## 2024-07-25 ENCOUNTER — OFFICE VISIT (OUTPATIENT)
Dept: OBGYN CLINIC | Age: 39
End: 2024-07-25
Payer: COMMERCIAL

## 2024-07-25 VITALS
WEIGHT: 189 LBS | BODY MASS INDEX: 33.48 KG/M2 | SYSTOLIC BLOOD PRESSURE: 136 MMHG | DIASTOLIC BLOOD PRESSURE: 84 MMHG | HEART RATE: 76 BPM

## 2024-07-25 DIAGNOSIS — R61 SWEATING INCREASE: Primary | ICD-10-CM

## 2024-07-25 DIAGNOSIS — R53.83 OTHER FATIGUE: ICD-10-CM

## 2024-07-25 DIAGNOSIS — E34.9 HORMONE IMBALANCE: ICD-10-CM

## 2024-07-25 DIAGNOSIS — R63.5 WEIGHT GAIN: ICD-10-CM

## 2024-07-25 PROCEDURE — 99213 OFFICE O/P EST LOW 20 MIN: CPT | Performed by: NURSE PRACTITIONER

## 2024-07-25 NOTE — PATIENT INSTRUCTIONS

## 2024-07-25 NOTE — PROGRESS NOTES
[facility]  CN Office Note    Mary Alvarez is a 38 y.o. female who presents today for her medical conditions/ complaints as noted below.  Chief Complaint   Patient presents with    Weight Gain     Pt is here to discuss weight gain and her hormones. She has been working out and dieting and haven't seen any changes with the scale. She also mentions waking up sweating and fatigue. She had her IUD replace earlier this year.          Mary presents to the office to discuss weight gain. Pt states she is exercising and eating right. Pt states she is at her heaviest weight. Pt denies eating out frequently. Pt has not had any recent blood work, and cycles are minimal due to her IUD>     Weight Gain  This is a new problem. The current episode started more than 1 month ago. The problem has been unchanged.       Patient Active Problem List   Diagnosis    Vitamin D deficiency    Migraine without status migrainosus, not intractable    Generalized anxiety disorder    Sleep disturbance    Other fatigue    Photophobia    Visual disturbance    Anxiety    Migraine syndrome       No LMP recorded. (Menstrual status: IUD).      Past Medical History:   Diagnosis Date    Anxiety     Depression     Migraines      Past Surgical History:   Procedure Laterality Date     SECTION       Family History   Problem Relation Age of Onset    Lung Cancer Mother 69        pt smoker for years    Diabetes Father     Heart Disease Father 45    Diabetes Sister 20        type 1     Social History     Tobacco Use    Smoking status: Never    Smokeless tobacco: Never   Substance Use Topics    Alcohol use: Yes     Comment: Rarely       Current Outpatient Medications   Medication Sig Dispense Refill    Semaglutide-Weight Management (WEGOVY) 0.25 MG/0.5ML SOAJ SC injection Inject 0.25 mg into the skin every 7 days 2 mL 0    escitalopram (LEXAPRO) 10 MG tablet TAKE 1 TABLET BY MOUTH EVERY DAY 90 tablet 3    MIRENA, 52 MG, IUD 52 mg

## 2024-07-26 DIAGNOSIS — R63.5 WEIGHT GAIN: ICD-10-CM

## 2024-07-26 DIAGNOSIS — R53.83 OTHER FATIGUE: ICD-10-CM

## 2024-07-26 LAB
ALBUMIN SERPL-MCNC: 4.1 G/DL (ref 3.5–5.2)
ALP SERPL-CCNC: 97 U/L (ref 35–104)
ALT SERPL-CCNC: 14 U/L (ref 5–33)
ANION GAP SERPL CALCULATED.3IONS-SCNC: 12 MMOL/L (ref 7–19)
AST SERPL-CCNC: 15 U/L (ref 5–32)
BILIRUB SERPL-MCNC: 0.5 MG/DL (ref 0.2–1.2)
BUN SERPL-MCNC: 12 MG/DL (ref 6–20)
CALCIUM SERPL-MCNC: 8.6 MG/DL (ref 8.6–10)
CHLORIDE SERPL-SCNC: 103 MMOL/L (ref 98–111)
CHOLEST SERPL-MCNC: 178 MG/DL (ref 160–199)
CO2 SERPL-SCNC: 23 MMOL/L (ref 22–29)
CORTIS SERPL-MCNC: 15.2 UG/DL
CREAT SERPL-MCNC: 0.7 MG/DL (ref 0.5–0.9)
ERYTHROCYTE [DISTWIDTH] IN BLOOD BY AUTOMATED COUNT: 11.9 % (ref 11.5–14.5)
GLUCOSE SERPL-MCNC: 90 MG/DL (ref 74–109)
HCT VFR BLD AUTO: 41.4 % (ref 37–47)
HDLC SERPL-MCNC: 37 MG/DL (ref 65–121)
HGB BLD-MCNC: 13.8 G/DL (ref 12–16)
LDLC SERPL CALC-MCNC: 101 MG/DL
MCH RBC QN AUTO: 30.1 PG (ref 27–31)
MCHC RBC AUTO-ENTMCNC: 33.3 G/DL (ref 33–37)
MCV RBC AUTO: 90.2 FL (ref 81–99)
PLATELET # BLD AUTO: 242 K/UL (ref 130–400)
PMV BLD AUTO: 9.7 FL (ref 9.4–12.3)
POTASSIUM SERPL-SCNC: 3.9 MMOL/L (ref 3.5–5)
PROT SERPL-MCNC: 6.6 G/DL (ref 6.6–8.7)
RBC # BLD AUTO: 4.59 M/UL (ref 4.2–5.4)
SODIUM SERPL-SCNC: 138 MMOL/L (ref 136–145)
T4 FREE SERPL-MCNC: 1.21 NG/DL (ref 0.93–1.7)
TRIGL SERPL-MCNC: 202 MG/DL (ref 0–149)
TSH SERPL DL<=0.005 MIU/L-ACNC: 1.41 UIU/ML (ref 0.27–4.2)
WBC # BLD AUTO: 8.2 K/UL (ref 4.8–10.8)

## 2024-11-25 RX ORDER — ESCITALOPRAM OXALATE 10 MG/1
TABLET ORAL
Qty: 90 TABLET | Refills: 3 | Status: SHIPPED | OUTPATIENT
Start: 2024-11-25

## 2024-11-25 NOTE — TELEPHONE ENCOUNTER
Mary called requesting a refill of the below medication which has been pended for you:     Requested Prescriptions     Pending Prescriptions Disp Refills    escitalopram (LEXAPRO) 10 MG tablet 90 tablet 3     Sig: TAKE 1 TABLET BY MOUTH EVERY DAY       Last Appointment Date: Visit date not found  Next Appointment Date: 2/13/2025    No Known Allergies

## 2025-02-06 DIAGNOSIS — Z00.00 ANNUAL PHYSICAL EXAM: Primary | ICD-10-CM

## 2025-02-06 DIAGNOSIS — E55.9 VITAMIN D DEFICIENCY: ICD-10-CM

## 2025-02-10 DIAGNOSIS — Z00.00 ANNUAL PHYSICAL EXAM: ICD-10-CM

## 2025-02-10 DIAGNOSIS — E55.9 VITAMIN D DEFICIENCY: ICD-10-CM

## 2025-02-10 LAB
25(OH)D3 SERPL-MCNC: 47.6 NG/ML
ALBUMIN SERPL-MCNC: 4.2 G/DL (ref 3.5–5.2)
ALP SERPL-CCNC: 114 U/L (ref 35–104)
ALT SERPL-CCNC: 11 U/L (ref 5–33)
ANION GAP SERPL CALCULATED.3IONS-SCNC: 13 MMOL/L (ref 8–16)
AST SERPL-CCNC: 12 U/L (ref 5–32)
BASOPHILS # BLD: 0 K/UL (ref 0–0.2)
BASOPHILS NFR BLD: 0.5 % (ref 0–1)
BILIRUB SERPL-MCNC: 0.2 MG/DL (ref 0.2–1.2)
BUN SERPL-MCNC: 15 MG/DL (ref 6–20)
CALCIUM SERPL-MCNC: 9 MG/DL (ref 8.6–10)
CHLORIDE SERPL-SCNC: 104 MMOL/L (ref 98–107)
CHOLEST SERPL-MCNC: 161 MG/DL (ref 0–199)
CO2 SERPL-SCNC: 24 MMOL/L (ref 22–29)
CREAT SERPL-MCNC: 0.8 MG/DL (ref 0.5–0.9)
EOSINOPHIL # BLD: 0.1 K/UL (ref 0–0.6)
EOSINOPHIL NFR BLD: 1.5 % (ref 0–5)
ERYTHROCYTE [DISTWIDTH] IN BLOOD BY AUTOMATED COUNT: 12.1 % (ref 11.5–14.5)
GLUCOSE P FAST SERPL-MCNC: 97 MG/DL (ref 70–99)
HCT VFR BLD AUTO: 40 % (ref 37–47)
HDLC SERPL-MCNC: 38 MG/DL (ref 40–60)
HGB BLD-MCNC: 13.9 G/DL (ref 12–16)
IMM GRANULOCYTES # BLD: 0.1 K/UL
LDLC SERPL CALC-MCNC: 105 MG/DL
LYMPHOCYTES # BLD: 2.5 K/UL (ref 1.1–4.5)
LYMPHOCYTES NFR BLD: 30.8 % (ref 20–40)
MCH RBC QN AUTO: 31.6 PG (ref 27–31)
MCHC RBC AUTO-ENTMCNC: 34.8 G/DL (ref 33–37)
MCV RBC AUTO: 90.9 FL (ref 81–99)
MONOCYTES # BLD: 0.6 K/UL (ref 0–0.9)
MONOCYTES NFR BLD: 7.2 % (ref 0–10)
NEUTROPHILS # BLD: 4.8 K/UL (ref 1.5–7.5)
NEUTS SEG NFR BLD: 59.4 % (ref 50–65)
PLATELET # BLD AUTO: 257 K/UL (ref 130–400)
PMV BLD AUTO: 10.3 FL (ref 9.4–12.3)
POTASSIUM SERPL-SCNC: 4.3 MMOL/L (ref 3.5–5.1)
PROT SERPL-MCNC: 6.7 G/DL (ref 6.4–8.3)
RBC # BLD AUTO: 4.4 M/UL (ref 4.2–5.4)
SODIUM SERPL-SCNC: 141 MMOL/L (ref 136–145)
TRIGL SERPL-MCNC: 90 MG/DL (ref 0–149)
TSH SERPL DL<=0.005 MIU/L-ACNC: 1.78 UIU/ML (ref 0.27–4.2)
WBC # BLD AUTO: 8.1 K/UL (ref 4.8–10.8)

## 2025-02-13 ENCOUNTER — OFFICE VISIT (OUTPATIENT)
Dept: INTERNAL MEDICINE | Age: 40
End: 2025-02-13
Payer: COMMERCIAL

## 2025-02-13 VITALS
BODY MASS INDEX: 31.61 KG/M2 | HEART RATE: 64 BPM | WEIGHT: 178.4 LBS | DIASTOLIC BLOOD PRESSURE: 78 MMHG | OXYGEN SATURATION: 98 % | HEIGHT: 63 IN | SYSTOLIC BLOOD PRESSURE: 118 MMHG

## 2025-02-13 DIAGNOSIS — E66.09 EXOGENOUS OBESITY: ICD-10-CM

## 2025-02-13 DIAGNOSIS — F41.1 GENERALIZED ANXIETY DISORDER: ICD-10-CM

## 2025-02-13 DIAGNOSIS — E55.9 VITAMIN D DEFICIENCY: ICD-10-CM

## 2025-02-13 DIAGNOSIS — G43.909 MIGRAINE SYNDROME: ICD-10-CM

## 2025-02-13 DIAGNOSIS — Z00.00 ANNUAL PHYSICAL EXAM: Primary | ICD-10-CM

## 2025-02-13 DIAGNOSIS — E78.00 PURE HYPERCHOLESTEROLEMIA: ICD-10-CM

## 2025-02-13 PROCEDURE — 99395 PREV VISIT EST AGE 18-39: CPT | Performed by: INTERNAL MEDICINE

## 2025-02-13 RX ORDER — ATOGEPANT 60 MG/1
1 TABLET ORAL DAILY
COMMUNITY

## 2025-02-13 RX ORDER — ESCITALOPRAM OXALATE 10 MG/1
TABLET ORAL
Qty: 90 TABLET | Refills: 3 | Status: SHIPPED | OUTPATIENT
Start: 2025-02-13

## 2025-02-13 SDOH — ECONOMIC STABILITY: FOOD INSECURITY: WITHIN THE PAST 12 MONTHS, THE FOOD YOU BOUGHT JUST DIDN'T LAST AND YOU DIDN'T HAVE MONEY TO GET MORE.: NEVER TRUE

## 2025-02-13 SDOH — ECONOMIC STABILITY: FOOD INSECURITY: WITHIN THE PAST 12 MONTHS, YOU WORRIED THAT YOUR FOOD WOULD RUN OUT BEFORE YOU GOT MONEY TO BUY MORE.: NEVER TRUE

## 2025-02-13 ASSESSMENT — PATIENT HEALTH QUESTIONNAIRE - PHQ9
SUM OF ALL RESPONSES TO PHQ9 QUESTIONS 1 & 2: 0
SUM OF ALL RESPONSES TO PHQ QUESTIONS 1-9: 0
2. FEELING DOWN, DEPRESSED OR HOPELESS: NOT AT ALL
SUM OF ALL RESPONSES TO PHQ QUESTIONS 1-9: 0
1. LITTLE INTEREST OR PLEASURE IN DOING THINGS: NOT AT ALL

## 2025-02-13 ASSESSMENT — ENCOUNTER SYMPTOMS
CONSTIPATION: 0
COUGH: 0
ABDOMINAL PAIN: 0
WHEEZING: 0
CHEST TIGHTNESS: 0
SORE THROAT: 0

## 2025-02-13 NOTE — PROGRESS NOTES
Chief Complaint:   Mary Alvarez is a 39 y.o. female who presents forcomplete physical exam.    History of Present Illness:      Mary Alvarez is a 39 y.o. female who presents todayfor wellness visit AND follow up on her chronic medical conditions as noted below.    Patient Active Problem List    Diagnosis Date Noted    Migraine syndrome 2019    Photophobia 2019    Visual disturbance 2019    Anxiety 2019    Vitamin D deficiency 2018    Migraine without status migrainosus, not intractable 2018    Generalized anxiety disorder 2018    Sleep disturbance 2018    Other fatigue 2018       Past Medical History:   Diagnosis Date    Anxiety     Depression     Migraines        Past Surgical History:   Procedure Laterality Date     SECTION         Current Outpatient Medications   Medication Sig Dispense Refill    QULIPTA 60 MG TABS Take 1 tablet by mouth daily      escitalopram (LEXAPRO) 10 MG tablet TAKE 1 TABLET BY MOUTH EVERY DAY 90 tablet 3    MIRENA, 52 MG, IUD 52 mg       SUMAtriptan (IMITREX) 100 MG tablet TAKE 1 TABLET BY MOUTH EVERY DAY AS NEEDED 9 tablet 11    Ubrogepant 100 MG TABS Take 100 mg by mouth as needed      metoprolol succinate (TOPROL XL) 25 MG extended release tablet Take 1 tablet by mouth daily      SUMAtriptan (IMITREX) 6 MG/0.5ML SOLN injection Inject 0.5 mLs into the skin once as needed for Migraine      Cholecalciferol (VITAMIN D) 2000 units CAPS capsule Take 1 capsule by mouth daily       No current facility-administered medications for this visit.     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

## 2025-02-26 ENCOUNTER — OFFICE VISIT (OUTPATIENT)
Dept: OBGYN CLINIC | Age: 40
End: 2025-02-26
Payer: COMMERCIAL

## 2025-02-26 VITALS
BODY MASS INDEX: 31.35 KG/M2 | SYSTOLIC BLOOD PRESSURE: 118 MMHG | DIASTOLIC BLOOD PRESSURE: 80 MMHG | HEART RATE: 58 BPM | WEIGHT: 177 LBS

## 2025-02-26 DIAGNOSIS — B08.1 MOLLUSCA CONTAGIOSA: ICD-10-CM

## 2025-02-26 DIAGNOSIS — Z30.431 CHECKING OF INTRAUTERINE DEVICE: ICD-10-CM

## 2025-02-26 DIAGNOSIS — Z12.39 ENCOUNTER FOR SCREENING BREAST EXAMINATION: ICD-10-CM

## 2025-02-26 DIAGNOSIS — Z01.419 WELL WOMAN EXAM WITH ROUTINE GYNECOLOGICAL EXAM: Primary | ICD-10-CM

## 2025-02-26 DIAGNOSIS — Z12.4 SCREENING FOR CERVICAL CANCER: ICD-10-CM

## 2025-02-26 PROCEDURE — 99395 PREV VISIT EST AGE 18-39: CPT | Performed by: NURSE PRACTITIONER

## 2025-02-26 RX ORDER — TRETINOIN 0.1 MG/G
GEL TOPICAL
Qty: 45 G | Refills: 1 | Status: SHIPPED | OUTPATIENT
Start: 2025-02-26

## 2025-02-26 NOTE — PROGRESS NOTES
Pt presents today for pap smear and breast exam.      Last mammogram:  never  Last pap smear:  2024  Contraception, tubal, vasectomy or condoms:  IUD  :  2  Para:  2  AB:  0  Last bone density:  never  Last colonoscopy:   never  Sexual Active: yes

## 2025-02-26 NOTE — PATIENT INSTRUCTIONS
Use firm pressure to feel your tissue close to your breastbone and ribs. Use each pressure level to feel your breast tissue before moving on to the next spot.  Check the entire breast area and armpit.  Repeat this procedure for your left breast, using the pads of the 3 middle fingers of your right hand.  To check your breasts while in the shower:  Place one arm over your head, and lightly soap your breast on that side.  Using the pads of your fingers, gently move your hand over the entire breast area and armpit, feeling carefully for any lumps or changes.  Repeat for the other breast.  Have your doctor check anything you notice to see if you need further testing.  Where can you learn more?  Go to https://www.DeepDyve.net/patientEd and enter P148 to learn more about \"Breast Self-Exam: Care Instructions.\"  Current as of: April 30, 2024  Content Version: 14.3  © 2024 [facility].   Care instructions adapted under license by [id number]. If you have questions about a medical condition or this instruction, always ask your healthcare professional. [employer] disclaims any warranty or liability for your use of this information.     We are committed to providing you with the best care possible.  In order to help us achieve these goals please remember to bring all medications, herbal products, and over the counter supplements with you to each visit.    If your provider has ordered testing for you, please be sure to follow up with our office if you have not received results within 7 days after testing took place.    *If you receive a survey after visiting one of our offices, please take the time to share your experience concerning your physician office visit. These surveys are confidential and no health information about you is shared. We are eager to improve for you and we are counting on your feedback to help make that happen.

## 2025-02-26 NOTE — PROGRESS NOTES
[facility]  CN Office Note    Mary Alvarez is a 39 y.o. female who presents today for her medical conditions/ complaints as noted below.  Chief Complaint   Patient presents with    Annual Exam       Mary presents to the office for her yearly exam, pt is doing well. Pt does not have a monthly cycle, she has an IUD in place. Pt has her IUD placed in 2023. Pt denies any breast tenderness or pelvic pain. Pt reports her daughter has had molluscum and now she has some in the mons pubis area and would like to discuss possible treatment         Patient Active Problem List   Diagnosis    Vitamin D deficiency    Migraine without status migrainosus, not intractable    Generalized anxiety disorder    Sleep disturbance    Other fatigue    Photophobia    Visual disturbance    Anxiety    Migraine syndrome       No LMP recorded. (Menstrual status: IUD).      Past Medical History:   Diagnosis Date    Anxiety     Depression     Migraines      Past Surgical History:   Procedure Laterality Date     SECTION       Family History   Problem Relation Age of Onset    Lung Cancer Mother 69        pt smoker for years    Diabetes Father     Coronary Art Dis Father 45    Diabetes Sister 20        type 1    Colon Cancer Maternal Grandmother 82     Social History     Tobacco Use    Smoking status: Never    Smokeless tobacco: Never   Substance Use Topics    Alcohol use: Yes     Comment: Rarely       Current Outpatient Medications   Medication Sig Dispense Refill    tretinoin (RETIN-A) 0.01 % gel Apply topically nightly. 45 g 1    QULIPTA 60 MG TABS Take 1 tablet by mouth daily      escitalopram (LEXAPRO) 10 MG tablet TAKE 1 TABLET BY MOUTH EVERY DAY 90 tablet 3    MIRENA, 52 MG, IUD 52 mg       SUMAtriptan (IMITREX) 100 MG tablet TAKE 1 TABLET BY MOUTH EVERY DAY AS NEEDED 9 tablet 11    Ubrogepant 100 MG TABS Take 100 mg by mouth as needed      metoprolol succinate (TOPROL XL) 25 MG extended release tablet Take 1

## 2025-03-04 ASSESSMENT — ENCOUNTER SYMPTOMS
GASTROINTESTINAL NEGATIVE: 1
RESPIRATORY NEGATIVE: 1
EYES NEGATIVE: 1
ALLERGIC/IMMUNOLOGIC NEGATIVE: 1

## 2025-03-12 DIAGNOSIS — B08.1 MOLLUSCA CONTAGIOSA: Primary | ICD-10-CM

## 2025-03-12 RX ORDER — TRETINOIN 0.25 MG/G
CREAM TOPICAL
Qty: 45 G | Refills: 1 | Status: SHIPPED | OUTPATIENT
Start: 2025-03-12